# Patient Record
Sex: FEMALE | Race: WHITE | NOT HISPANIC OR LATINO | Employment: OTHER | URBAN - METROPOLITAN AREA
[De-identification: names, ages, dates, MRNs, and addresses within clinical notes are randomized per-mention and may not be internally consistent; named-entity substitution may affect disease eponyms.]

---

## 2017-04-03 ENCOUNTER — HOSPITAL ENCOUNTER (OUTPATIENT)
Dept: GENERAL RADIOLOGY | Facility: HOSPITAL | Age: 77
Discharge: HOME | End: 2017-04-03

## 2017-04-11 ENCOUNTER — HOSPITAL ENCOUNTER (OUTPATIENT)
Dept: FAMILY MEDICINE CLINIC | Facility: CLINIC | Age: 77
Setting detail: SPECIMEN
Discharge: HOME | End: 2017-04-11

## 2017-04-13 LAB
BACTERIA SPEC AEROBE CULT: NORMAL
Lab: NORMAL
MICRO REPORT STATUS: NORMAL
SPECIMEN SOURCE: NORMAL

## 2017-07-13 ENCOUNTER — HOSPITAL ENCOUNTER (OUTPATIENT)
Dept: FAMILY MEDICINE CLINIC | Facility: CLINIC | Age: 77
Setting detail: SPECIMEN
Discharge: HOME | End: 2017-07-13
Attending: FAMILY MEDICINE | Admitting: FAMILY MEDICINE

## 2017-07-13 LAB
ALBUMIN SERPL-MCNC: 4 G/DL (ref 3.5–4.8)
ALBUMIN/GLOB SERPL: 1.4 {RATIO} (ref 1–1.7)
ALP SERPL-CCNC: 85 IU/L (ref 32–91)
ALT SERPL-CCNC: 22 IU/L (ref 14–54)
ANION GAP SERPL CALC-SCNC: 14.2 MMOL/L (ref 10–20)
AST SERPL-CCNC: 24 IU/L (ref 15–41)
BILIRUB SERPL-MCNC: 0.7 MG/DL (ref 0.3–1.2)
BUN SERPL-MCNC: 19 MG/DL (ref 8–20)
BUN/CREAT SERPL: 27.1 (ref 5.4–26.2)
CALCIUM SERPL-MCNC: 9.3 MG/DL (ref 8.9–10.3)
CHLORIDE SERPL-SCNC: 107 MMOL/L (ref 101–111)
CONV CO2: 24 MMOL/L (ref 22–32)
CONV TOTAL PROTEIN: 6.8 G/DL (ref 6.1–7.9)
CREAT UR-MCNC: 0.7 MG/DL (ref 0.4–1)
GLOBULIN UR ELPH-MCNC: 2.8 G/DL (ref 2.5–3.8)
GLUCOSE SERPL-MCNC: 171 MG/DL (ref 65–99)
POTASSIUM SERPL-SCNC: 4.2 MMOL/L (ref 3.6–5.1)
SODIUM SERPL-SCNC: 141 MMOL/L (ref 136–144)

## 2017-11-16 ENCOUNTER — HOSPITAL ENCOUNTER (OUTPATIENT)
Dept: FAMILY MEDICINE CLINIC | Facility: CLINIC | Age: 77
Setting detail: SPECIMEN
Discharge: HOME | End: 2017-11-16
Attending: FAMILY MEDICINE | Admitting: FAMILY MEDICINE

## 2017-11-16 LAB
ANION GAP SERPL CALC-SCNC: 10.1 MMOL/L (ref 10–20)
BUN SERPL-MCNC: 14 MG/DL (ref 8–20)
BUN/CREAT SERPL: 17.5 (ref 5.4–26.2)
CALCIUM SERPL-MCNC: 9.5 MG/DL (ref 8.9–10.3)
CHLORIDE SERPL-SCNC: 105 MMOL/L (ref 101–111)
CONV CO2: 28 MMOL/L (ref 22–32)
CREAT UR-MCNC: 0.8 MG/DL (ref 0.4–1)
GLUCOSE SERPL-MCNC: 161 MG/DL (ref 65–99)
POTASSIUM SERPL-SCNC: 5.1 MMOL/L (ref 3.6–5.1)
SODIUM SERPL-SCNC: 138 MMOL/L (ref 136–144)
TSH SERPL-ACNC: 0.78 UIU/ML (ref 0.34–5.6)

## 2017-12-01 ENCOUNTER — HOSPITAL ENCOUNTER (OUTPATIENT)
Dept: MAMMOGRAPHY | Facility: HOSPITAL | Age: 77
Discharge: HOME | End: 2017-12-01
Attending: FAMILY MEDICINE | Admitting: FAMILY MEDICINE

## 2018-01-09 ENCOUNTER — HOSPITAL ENCOUNTER (OUTPATIENT)
Dept: GENERAL RADIOLOGY | Facility: HOSPITAL | Age: 78
Discharge: HOME | End: 2018-01-09
Attending: FAMILY MEDICINE | Admitting: FAMILY MEDICINE

## 2018-02-14 ENCOUNTER — HOSPITAL ENCOUNTER (OUTPATIENT)
Dept: FAMILY MEDICINE CLINIC | Facility: CLINIC | Age: 78
Setting detail: SPECIMEN
Discharge: HOME | End: 2018-02-14
Attending: FAMILY MEDICINE | Admitting: FAMILY MEDICINE

## 2018-02-15 LAB
BACTERIA SPEC AEROBE CULT: NORMAL
Lab: NORMAL
MICRO REPORT STATUS: NORMAL
SPECIMEN SOURCE: NORMAL

## 2018-05-15 ENCOUNTER — HOSPITAL ENCOUNTER (OUTPATIENT)
Dept: FAMILY MEDICINE CLINIC | Facility: CLINIC | Age: 78
Setting detail: SPECIMEN
Discharge: HOME | End: 2018-05-15
Attending: FAMILY MEDICINE | Admitting: FAMILY MEDICINE

## 2018-05-15 LAB
ALBUMIN SERPL-MCNC: 3.9 G/DL (ref 3.5–4.8)
ALBUMIN/GLOB SERPL: 1.2 {RATIO} (ref 1–1.7)
ALP SERPL-CCNC: 89 IU/L (ref 32–91)
ALT SERPL-CCNC: 24 IU/L (ref 14–54)
ANION GAP SERPL CALC-SCNC: 9.6 MMOL/L (ref 10–20)
AST SERPL-CCNC: 21 IU/L (ref 15–41)
BILIRUB SERPL-MCNC: 0.3 MG/DL (ref 0.3–1.2)
BUN SERPL-MCNC: 12 MG/DL (ref 8–20)
BUN/CREAT SERPL: 17.1 (ref 5.4–26.2)
CALCIUM SERPL-MCNC: 9.2 MG/DL (ref 8.9–10.3)
CHLORIDE SERPL-SCNC: 107 MMOL/L (ref 101–111)
CONV CO2: 26 MMOL/L (ref 22–32)
CONV TOTAL PROTEIN: 7.1 G/DL (ref 6.1–7.9)
CREAT UR-MCNC: 0.7 MG/DL (ref 0.4–1)
GLOBULIN UR ELPH-MCNC: 3.2 G/DL (ref 2.5–3.8)
GLUCOSE SERPL-MCNC: 123 MG/DL (ref 65–99)
POTASSIUM SERPL-SCNC: 4.6 MMOL/L (ref 3.6–5.1)
SODIUM SERPL-SCNC: 138 MMOL/L (ref 136–144)

## 2018-10-28 ENCOUNTER — HOSPITAL ENCOUNTER (OUTPATIENT)
Dept: URGENT CARE | Facility: CLINIC | Age: 78
Discharge: HOME | End: 2018-10-28
Attending: FAMILY MEDICINE | Admitting: FAMILY MEDICINE

## 2019-03-22 ENCOUNTER — HOSPITAL ENCOUNTER (OUTPATIENT)
Dept: FAMILY MEDICINE CLINIC | Facility: CLINIC | Age: 79
Setting detail: SPECIMEN
Discharge: HOME | End: 2019-03-22
Attending: FAMILY MEDICINE | Admitting: FAMILY MEDICINE

## 2019-03-22 LAB
ALBUMIN SERPL-MCNC: 4.3 G/DL (ref 3.5–4.8)
ALBUMIN/GLOB SERPL: 1.3 {RATIO} (ref 1–1.7)
ALP SERPL-CCNC: 100 IU/L (ref 32–91)
ALT SERPL-CCNC: 24 IU/L (ref 14–54)
ANION GAP SERPL CALC-SCNC: 15.5 MMOL/L (ref 10–20)
AST SERPL-CCNC: 21 IU/L (ref 15–41)
BILIRUB SERPL-MCNC: 0.5 MG/DL (ref 0.3–1.2)
BUN SERPL-MCNC: 13 MG/DL (ref 8–20)
BUN/CREAT SERPL: 16.3 (ref 5.4–26.2)
CALCIUM SERPL-MCNC: 9.3 MG/DL (ref 8.9–10.3)
CHLORIDE SERPL-SCNC: 107 MMOL/L (ref 101–111)
CONV CO2: 21 MMOL/L (ref 22–32)
CONV MICROALBUM.,U,RANDOM: 59 MG/L
CONV TOTAL PROTEIN: 7.6 G/DL (ref 6.1–7.9)
CREAT 24H UR-MCNC: 90.3 MG/DL
CREAT UR-MCNC: 0.8 MG/DL (ref 0.4–1)
GLOBULIN UR ELPH-MCNC: 3.3 G/DL (ref 2.5–3.8)
GLUCOSE SERPL-MCNC: 163 MG/DL (ref 65–99)
MICROALBUMIN/CREAT UR: 65.3 UG/MG
POTASSIUM SERPL-SCNC: 4.5 MMOL/L (ref 3.6–5.1)
SODIUM SERPL-SCNC: 139 MMOL/L (ref 136–144)

## 2019-04-15 ENCOUNTER — HOSPITAL ENCOUNTER (OUTPATIENT)
Dept: MAMMOGRAPHY | Facility: HOSPITAL | Age: 79
Discharge: HOME | End: 2019-04-15
Attending: FAMILY MEDICINE | Admitting: FAMILY MEDICINE

## 2019-06-11 ENCOUNTER — CONVERSION ENCOUNTER (OUTPATIENT)
Dept: FAMILY MEDICINE CLINIC | Facility: CLINIC | Age: 79
End: 2019-06-11

## 2019-06-12 VITALS
DIASTOLIC BLOOD PRESSURE: 81 MMHG | WEIGHT: 133 LBS | SYSTOLIC BLOOD PRESSURE: 149 MMHG | OXYGEN SATURATION: 98 % | BODY MASS INDEX: 25.11 KG/M2 | HEIGHT: 61 IN | HEART RATE: 86 BPM

## 2019-06-13 NOTE — PROGRESS NOTES
Visit Type:  Acute Visit  Referring Provider:  Shavonne Rush MD  Primary Provider:  Adri FLORIAN MD      History of Present Illness:  Pt noticed a bump on her head a couple months ago, started having headaches couple week ago     comes in with c/o a bump on her head for a few months  developed headaches 2 weeks ago  2 falls but both were triggered (tripped on a cord and caught foot on a loose tile)  saw eye doctor recently and was told all was good  daughter dx with breast cancer in March  she says it is a discomfort - pulsation/throb- the radiates vertically down the occiput on the left  brief and intermittent  lasts alfred a few seconds  usuallly only a few times per week  the headache is always on the left occiput as well       Past Medical History:     Reviewed history from 08/10/2017 and no changes required:        Arthritis        Diabetes        melanoma- yearly w/ derm    Family History Summary:      Reviewed history Last on 03/22/2019 and no changes required:06/11/2019  Daughter - Has Family History of Breast Cancer - Entered On: 3/22/2019      Social History:     Reviewed history from 03/22/2019 and no changes required:        Patient has never smoked.        Passive Smoke: N        Alcohol Use: N        Drug Use: N        HIV/High Risk: N        Regular Exercise: N                Alcohol Use: N    Social History Summary:  Patient has never smoked.  Passive Smoke: N  Alcohol Use: N  Drug Use: N  HIV/High Risk: N  Regular Exercise: N    Alcohol Use: N  Social History Reviewed: 06/11/2019        Active Medications (reviewed today):  AMLODIPINE BESYLATE TABS 5MG (AMLODIPINE BESYLATE) TAKE 1 TABLET DAILY FOR BLOOD PRESSURE  GLIMEPIRIDE TABS 4MG (GLIMEPIRIDE) TAKE 1 TABLET TWICE A DAY  ONETOUCH ULTRA BLUE IN VITRO STRIP (GLUCOSE BLOOD) test blood sugar daily    Current Allergies (reviewed today):  * CODEINE (Critical)  * LISINOPRIL (Critical)      Risk Factors:     Smoked Tobacco Use:   Never smoker  Drug use:  no  HIV high-risk behavior:  no  Alcohol use:  no  Exercise:  no      Review of Systems     General       Denies fever, chills, sweats, anorexia, fatigue, weakness, malaise, weight loss, weight gain and sleep disorder.    Resp       Denies cough, shortness of breath and chest discomfort.    GI       Denies nausea and vomiting.    MS       Complains of neck pain.    Derm       Denies rash.    Neuro       Complains of headaches.       Denies numbness, tingling, visual disturbances, weakness, sensation of room spinning, tremors and dizziness.    Heme       Denies enlarged lymph nodes, bleeding, skin discoloration, abnormal bruising, fevers and nose bleeds.      Vital Signs:    Patient Profile:    78 Years Old Female  Height:     61 inches  Weight:     133 pounds  BMI:        25.13     O2 Sat:     98 %  Temp:       97.9 degrees F oral  Pulse rate: 86 / minute  BP Sittin / 81  (left arm)    Cuff size:  regular      Problems: Active problems were reviewed with the patient during this visit.  Medications: Medications were reviewed with the patient during this visit.  Allergies: Allergies were reviewed with the patient during this visit.        Vitals Entered By: Yani Cervantes CMA (2019 1:38 PM)        Blood Pressure:  Today's BP: 149/81 mm Hg    Labwork:   Most Recent Lab Results:   HbA1c: : 7.0 % 2019        Impression & Recommendations:    Problem # 1:  HEADACHE (ICD-784.0) ([id number])  Assessment: New  feel like this is tension in origin  if symptoms change or increase in severity, will consider CT    Problem # 2:  MUSCLE SPASM, TRAPEZIUS (ICD-728.85) ([id number]-W71.830)  Assessment: New  hot showers and warm compresses to neck and shoulders   stress reduction      Patient Instructions:  1)  stress reduction  2)  hot showers and warm compresses to neck and shoulders  3)  if the discomfort gets worse, let me kinow                        Medication Administration    Orders  Added:  1)  [facility], Est Level III [06443]  ]      Electronically signed by hSavonne Rush MD on 06/11/2019 at 11:04 PM  ________________________________________________________________________       Disclaimer: Converted Note message may not contain all data elements that existed in the legacy source system. Please see [employer] System for the original note details.

## 2019-07-29 ENCOUNTER — TELEPHONE (OUTPATIENT)
Dept: FAMILY MEDICINE CLINIC | Facility: CLINIC | Age: 79
End: 2019-07-29

## 2019-08-28 ENCOUNTER — TELEPHONE (OUTPATIENT)
Dept: FAMILY MEDICINE CLINIC | Facility: CLINIC | Age: 79
End: 2019-08-28

## 2019-08-28 RX ORDER — DIPHENHYDRAMINE HCL 25 MG
1 TABLET ORAL DAILY
Qty: 1 KIT | Refills: 0 | Status: SHIPPED | OUTPATIENT
Start: 2019-08-28 | End: 2019-09-09 | Stop reason: SDUPTHER

## 2019-08-28 RX ORDER — GLUCOSAM/CHON-MSM1/C/MANG/BOSW 500-416.6
TABLET ORAL
Qty: 100 EACH | Refills: 1 | Status: SHIPPED | OUTPATIENT
Start: 2019-08-28 | End: 2019-09-09 | Stop reason: SDUPTHER

## 2019-08-28 RX ORDER — ISOPROPYL ALCOHOL 0.75 G/1
1 SWAB TOPICAL DAILY
Qty: 100 EACH | Refills: 1 | Status: SHIPPED | OUTPATIENT
Start: 2019-08-28

## 2019-08-28 RX ORDER — BLOOD-GLUCOSE CONTROL, LOW
1 EACH MISCELLANEOUS
Qty: 1 EACH | Refills: 1 | Status: SHIPPED | OUTPATIENT
Start: 2019-08-28

## 2019-09-09 RX ORDER — GLUCOSAM/CHON-MSM1/C/MANG/BOSW 500-416.6
TABLET ORAL
Qty: 100 EACH | Refills: 1 | Status: SHIPPED | OUTPATIENT
Start: 2019-09-09

## 2019-09-09 RX ORDER — DIPHENHYDRAMINE HCL 25 MG
1 TABLET ORAL DAILY
Qty: 1 KIT | Refills: 0 | Status: SHIPPED | OUTPATIENT
Start: 2019-09-09

## 2019-09-17 RX ORDER — GLIMEPIRIDE 4 MG/1
TABLET ORAL
Qty: 180 TABLET | Refills: 2 | Status: SHIPPED | OUTPATIENT
Start: 2019-09-17 | End: 2020-07-01 | Stop reason: SDUPTHER

## 2019-09-23 ENCOUNTER — OFFICE VISIT (OUTPATIENT)
Dept: FAMILY MEDICINE CLINIC | Facility: CLINIC | Age: 79
End: 2019-09-23

## 2019-09-23 ENCOUNTER — LAB (OUTPATIENT)
Dept: FAMILY MEDICINE CLINIC | Facility: CLINIC | Age: 79
End: 2019-09-23

## 2019-09-23 VITALS
SYSTOLIC BLOOD PRESSURE: 142 MMHG | HEIGHT: 62 IN | DIASTOLIC BLOOD PRESSURE: 80 MMHG | WEIGHT: 130 LBS | BODY MASS INDEX: 23.92 KG/M2 | HEART RATE: 82 BPM | TEMPERATURE: 98.4 F | OXYGEN SATURATION: 97 %

## 2019-09-23 DIAGNOSIS — R80.9 MICROALBUMINURIA: ICD-10-CM

## 2019-09-23 DIAGNOSIS — E11.65 TYPE 2 DIABETES MELLITUS WITH HYPERGLYCEMIA, WITHOUT LONG-TERM CURRENT USE OF INSULIN (HCC): ICD-10-CM

## 2019-09-23 DIAGNOSIS — I10 ESSENTIAL HYPERTENSION: Primary | ICD-10-CM

## 2019-09-23 DIAGNOSIS — Z23 NEED FOR VACCINATION: ICD-10-CM

## 2019-09-23 PROBLEM — R30.0 DIFFICULT OR PAINFUL URINATION: Status: ACTIVE | Noted: 2017-04-11

## 2019-09-23 PROBLEM — Z85.820 HISTORY OF MALIGNANT MELANOMA OF SKIN: Status: ACTIVE | Noted: 2017-05-10

## 2019-09-23 PROBLEM — M19.90 OSTEOARTHRITIS: Status: ACTIVE | Noted: 2018-02-20

## 2019-09-23 PROBLEM — B35.4 TINEA CORPORIS: Status: ACTIVE | Noted: 2018-05-15

## 2019-09-23 PROBLEM — M79.601 ARM PAIN, RIGHT: Status: ACTIVE | Noted: 2018-11-05

## 2019-09-23 PROBLEM — R03.0 ELEVATED BLOOD PRESSURE READING WITHOUT DIAGNOSIS OF HYPERTENSION: Status: ACTIVE | Noted: 2018-10-28

## 2019-09-23 PROBLEM — Z12.31 OTHER SCREENING MAMMOGRAM: Status: ACTIVE | Noted: 2017-11-16

## 2019-09-23 PROBLEM — N81.10 FEMALE BLADDER PROLAPSE: Status: ACTIVE | Noted: 2017-05-10

## 2019-09-23 PROBLEM — M19.029 ARTHRITIS OF ELBOW: Status: ACTIVE | Noted: 2018-10-28

## 2019-09-23 PROBLEM — E11.8 DISORDER ASSOCIATED WITH TYPE 2 DIABETES MELLITUS (HCC): Status: ACTIVE | Noted: 2017-05-10

## 2019-09-23 LAB
ALBUMIN SERPL-MCNC: 4.3 G/DL (ref 3.5–4.8)
ALBUMIN UR-MCNC: 12 MG/L
ALBUMIN/GLOB SERPL: 1.3 G/DL (ref 1–1.7)
ALP SERPL-CCNC: 93 U/L (ref 32–91)
ALT SERPL W P-5'-P-CCNC: 28 U/L (ref 14–54)
ANION GAP SERPL CALCULATED.3IONS-SCNC: 11.8 MMOL/L (ref 5–15)
AST SERPL-CCNC: 21 U/L (ref 15–41)
BILIRUB SERPL-MCNC: 0.5 MG/DL (ref 0.3–1.2)
BUN BLD-MCNC: 18 MG/DL (ref 8–20)
BUN/CREAT SERPL: 20 (ref 5.4–26.2)
CALCIUM SPEC-SCNC: 9.2 MG/DL (ref 8.9–10.3)
CHLORIDE SERPL-SCNC: 106 MMOL/L (ref 101–111)
CO2 SERPL-SCNC: 25 MMOL/L (ref 22–32)
CREAT BLD-MCNC: 0.9 MG/DL (ref 0.4–1)
GFR SERPL CREATININE-BSD FRML MDRD: 61 ML/MIN/1.73
GLOBULIN UR ELPH-MCNC: 3.3 GM/DL (ref 2.5–3.8)
GLUCOSE BLD-MCNC: 176 MG/DL (ref 65–99)
HBA1C MFR BLD: 7.3 % (ref 3.5–5.6)
POTASSIUM BLD-SCNC: 4.8 MMOL/L (ref 3.6–5.1)
PROT SERPL-MCNC: 7.6 G/DL (ref 6.1–7.9)
SODIUM BLD-SCNC: 138 MMOL/L (ref 136–144)

## 2019-09-23 PROCEDURE — 80053 COMPREHEN METABOLIC PANEL: CPT | Performed by: FAMILY MEDICINE

## 2019-09-23 PROCEDURE — G0008 ADMIN INFLUENZA VIRUS VAC: HCPCS | Performed by: FAMILY MEDICINE

## 2019-09-23 PROCEDURE — 99213 OFFICE O/P EST LOW 20 MIN: CPT | Performed by: FAMILY MEDICINE

## 2019-09-23 PROCEDURE — 82043 UR ALBUMIN QUANTITATIVE: CPT | Performed by: FAMILY MEDICINE

## 2019-09-23 PROCEDURE — 90674 CCIIV4 VAC NO PRSV 0.5 ML IM: CPT | Performed by: FAMILY MEDICINE

## 2019-09-23 PROCEDURE — 83036 HEMOGLOBIN GLYCOSYLATED A1C: CPT | Performed by: FAMILY MEDICINE

## 2019-09-23 PROCEDURE — 36415 COLL VENOUS BLD VENIPUNCTURE: CPT | Performed by: FAMILY MEDICINE

## 2019-09-23 RX ORDER — GLIMEPIRIDE 4 MG/1
TABLET ORAL EVERY 12 HOURS
COMMUNITY
Start: 2018-08-03 | End: 2019-11-06

## 2019-09-23 RX ORDER — AMLODIPINE BESYLATE 5 MG/1
TABLET ORAL
COMMUNITY
Start: 2019-02-07 | End: 2020-03-23

## 2019-09-23 NOTE — PROGRESS NOTES
Subjective   Raul Howell is a 78 y.o. female.     Here for follow-up on blood pressure and diabetes  BS running 100-150  Last eye exam was a few mo ago  [facility] [place]           The following portions of the patient's history were reviewed and updated as appropriate: allergies, current medications, past family history, past medical history, past social history, past surgical history and problem list.  Past Medical History:   Diagnosis Date   • Arthritis    • Diabetes (CMS/HCC)    • Melanoma (CMS/HCC)      Past Surgical History:   Procedure Laterality Date   • BLADDER SURGERY      2x   • KNEE SURGERY      right total knee   • WISDOM TOOTH EXTRACTION       Family History   Problem Relation Age of Onset   • Breast cancer Daughter      Social History     Socioeconomic History   • Marital status:      Spouse name: Not on file   • Number of children: Not on file   • Years of education: Not on file   • Highest education level: Not on file   Tobacco Use   • Smoking status: Never Smoker   Substance and Sexual Activity   • Alcohol use: No     Frequency: Never   • Drug use: No         Current Outpatient Medications:   •  amLODIPine (NORVASC) 5 MG tablet, AMLODIPINE BESYLATE 5 MG TABS, Disp: , Rfl:   •  glimepiride (AMARYL) 4 MG tablet, Every 12 (Twelve) Hours., Disp: , Rfl:   •  glucose blood (ONE TOUCH ULTRA TEST) test strip, ONETOUCH ULTRA BLUE STRP, Disp: , Rfl:   •  Alcohol Swabs (B-D SINGLE USE SWABS REGULAR) pads, 1 swab Daily., Disp: 100 each, Rfl: 1  •  Blood Glucose Calibration (TRUE METRIX LEVEL 2) Normal solution, 1 dose by In Vitro route Every 30 (Thirty) Days., Disp: 1 each, Rfl: 1  •  Blood Glucose Monitoring Suppl (TRUE METRIX AIR GLUCOSE METER) w/Device kit, 1 strip Daily., Disp: 1 kit, Rfl: 0  •  glimepiride (AMARYL) 4 MG tablet, TAKE 1 TABLET TWICE A DAY, Disp: 180 tablet, Rfl: 2  •  glucose blood (TRUE METRIX BLOOD GLUCOSE TEST) test strip, Test bs once a day (true metrix), Disp: 100  "each, Rfl: 1  •  TRUEPLUS LANCETS 30G misc, Test BS once a day, Disp: 100 each, Rfl: 1  No current facility-administered medications for this visit.     Review of Systems   Constitutional: Negative for diaphoresis, fatigue, fever, unexpected weight gain and unexpected weight loss.   Respiratory: Negative for cough, chest tightness and shortness of breath.    Cardiovascular: Negative for chest pain, palpitations and leg swelling.   Neurological: Negative for dizziness, syncope, numbness and headache.     /80   Pulse 82   Temp 98.4 °F (36.9 °C) (Oral)   Ht 157.5 cm (62\")   Wt 59 kg (130 lb)   SpO2 97%   BMI 23.78 kg/m²       Objective   Physical Exam   Constitutional: She appears well-developed and well-nourished. No distress.   HENT:   Head: Normocephalic and atraumatic.   Neck: Neck supple. No JVD present. No thyromegaly present.   Cardiovascular: Normal rate, regular rhythm, normal heart sounds and intact distal pulses. Exam reveals no gallop and no friction rub.   No murmur heard.  Pulmonary/Chest: Effort normal and breath sounds normal. No respiratory distress. She has no wheezes. She has no rales.   Musculoskeletal: She exhibits no edema.    Raul had a diabetic foot exam performed today.   During the foot exam she had a monofilament test not performed.  Vascular Status -  Her right foot exhibits normal foot vasculature  and no edema. Her left foot exhibits normal foot vasculature  and no edema.  Skin Integrity  -  Her right foot skin is intact. She has right foot onychomycosis and callous right foot.  She has no right foot ulcer, no ingrown toenail on right foot, right heel is not dry and cracked, no right foot warmth, no right foot blister and no right foot gangrenous changes.Her left foot skin is intact.She has left foot onychomycosis and callous left foot. She has no left foot ulcer, no left ingrown toenail, no left heel dry and cracked, no left foot warmth, no left foot blister and no left foot " gangrenous changes..   Foot Structure and Biomechanics -  Her right foot exhibits hallux valgus. Her right foot has no pes cavus, no claw toes and no hammer toes present.  Her left foot exhibits hallux valgus. Her left foot has no pes cavus, no claw toes and no hammer toes.  Lymphadenopathy:     She has no cervical adenopathy.   Neurological: She is alert.   Skin: Skin is warm and dry.   Psychiatric: She has a normal mood and affect.   Nursing note and vitals reviewed.        Assessment/Plan   Problems Addressed this Visit        Cardiovascular and Mediastinum    Hypertension - Primary    Relevant Medications    amLODIPine (NORVASC) 5 MG tablet    Other Relevant Orders    Comprehensive Metabolic Panel       Genitourinary    Microalbuminuria    Relevant Orders    MicroAlbumin, Urine, Random - Urine, Clean Catch      Other Visit Diagnoses     Type 2 diabetes mellitus with hyperglycemia, without long-term current use of insulin (CMS/[facility])        Relevant Medications    glimepiride (AMARYL) 4 MG tablet    Other Relevant Orders    Comprehensive Metabolic Panel    Hemoglobin A1c    MicroAlbumin, Urine, Random - Urine, Clean Catch    Need for vaccination        Relevant Medications    Influenza Vac Subunit Quad (FLUCELVAX) injection 0.5 mL (Completed) (Start on 9/23/2019 11:38 AM)

## 2019-10-16 ENCOUNTER — TELEPHONE (OUTPATIENT)
Dept: FAMILY MEDICINE CLINIC | Facility: CLINIC | Age: 79
End: 2019-10-16

## 2019-11-06 ENCOUNTER — OFFICE VISIT (OUTPATIENT)
Dept: FAMILY MEDICINE CLINIC | Facility: CLINIC | Age: 79
End: 2019-11-06

## 2019-11-06 VITALS
DIASTOLIC BLOOD PRESSURE: 83 MMHG | HEIGHT: 62 IN | SYSTOLIC BLOOD PRESSURE: 151 MMHG | OXYGEN SATURATION: 100 % | WEIGHT: 130 LBS | HEART RATE: 76 BPM | BODY MASS INDEX: 23.92 KG/M2

## 2019-11-06 DIAGNOSIS — R05.9 COUGH: Primary | ICD-10-CM

## 2019-11-06 DIAGNOSIS — M25.562 ACUTE PAIN OF LEFT KNEE: ICD-10-CM

## 2019-11-06 DIAGNOSIS — M62.838 TRAPEZIUS MUSCLE SPASM: ICD-10-CM

## 2019-11-06 PROCEDURE — 99213 OFFICE O/P EST LOW 20 MIN: CPT | Performed by: FAMILY MEDICINE

## 2019-11-06 NOTE — PATIENT INSTRUCTIONS
Be careful on wet surfaces  Be aware of surroundings  Hot showers and warm compresses to neck, shoulder and knees  Extra rest and fluids

## 2019-11-06 NOTE — PROGRESS NOTES
Subjective   Raul Howell is a 79 y.o. female.     She comes in today with several complaints  one is pain in the posterior aspect of her neck and head  Recurrent prob  Most recently noticed it while away on a trip  She was washing her car and either slipped on the surface or tripped on the hose  She fell forward and hit her face on the closed garage door and her knees on the driveway  No LOC  She was seen at the [facility]  Small laceration on the left side of her nose was repaired and sutures removed this past Monday  Still has some left knee pain and swelling   X-ray showed OA and small joint effusion  Has been doing ice  Has also had a cough for a week  Not productive  Feels fine           The following portions of the patient's history were reviewed and updated as appropriate: allergies, current medications, past family history, past medical history, past social history, past surgical history and problem list.  Past Medical History:   Diagnosis Date   • Arthritis    • Diabetes (CMS/HCC)    • Melanoma (CMS/HCC)      Past Surgical History:   Procedure Laterality Date   • BLADDER SURGERY      2x   • KNEE SURGERY      right total knee   • WISDOM TOOTH EXTRACTION       Family History   Problem Relation Age of Onset   • Breast cancer Daughter      Social History     Socioeconomic History   • Marital status:      Spouse name: Not on file   • Number of children: Not on file   • Years of education: Not on file   • Highest education level: Not on file   Tobacco Use   • Smoking status: Never Smoker   Substance and Sexual Activity   • Alcohol use: No     Frequency: Never   • Drug use: No         Current Outpatient Medications:   •  Alcohol Swabs (B-D SINGLE USE SWABS REGULAR) pads, 1 swab Daily., Disp: 100 each, Rfl: 1  •  amLODIPine (NORVASC) 5 MG tablet, AMLODIPINE BESYLATE 5 MG TABS, Disp: , Rfl:   •  benzonatate (TESSALON) 200 MG capsule, 1 capsule p.o. every 8 hours as needed cough, Disp: 21 capsule, Rfl: 0  •  Blood  "Glucose Calibration (TRUE METRIX LEVEL 2) Normal solution, 1 dose by In Vitro route Every 30 (Thirty) Days., Disp: 1 each, Rfl: 1  •  Blood Glucose Monitoring Suppl (TRUE METRIX AIR GLUCOSE METER) w/Device kit, 1 strip Daily., Disp: 1 kit, Rfl: 0  •  glimepiride (AMARYL) 4 MG tablet, TAKE 1 TABLET TWICE A DAY, Disp: 180 tablet, Rfl: 2  •  glucose blood (TRUE METRIX BLOOD GLUCOSE TEST) test strip, Test bs once a day (true metrix), Disp: 100 each, Rfl: 1  •  TRUEPLUS LANCETS 30G misc, Test BS once a day, Disp: 100 each, Rfl: 1    Review of Systems   Constitutional: Negative for diaphoresis, fatigue, fever, unexpected weight gain and unexpected weight loss.   HENT: Negative for ear pain, facial swelling, hearing loss, sinus pressure, sore throat and tinnitus.    Eyes: Negative for blurred vision and visual disturbance.   Respiratory: Positive for cough (nonproductive). Negative for chest tightness and shortness of breath.    Cardiovascular: Negative for chest pain, palpitations and leg swelling.   Gastrointestinal: Negative for nausea and vomiting.   Musculoskeletal: Positive for arthralgias, joint swelling, neck pain and neck stiffness.   Neurological: Positive for headache. Negative for dizziness, syncope, light-headedness and numbness.   Hematological: Negative.      /83 (BP Location: Left arm, Patient Position: Sitting, Cuff Size: Adult)   Pulse 76   Ht 157.5 cm (62\")   Wt 59 kg (130 lb)   SpO2 100%   BMI 23.78 kg/m²       Objective   Physical Exam   Constitutional: She is oriented to person, place, and time. She appears well-developed and well-nourished. No distress.   HENT:   Head: Normocephalic and atraumatic.   Right Ear: Tympanic membrane normal.   Left Ear: Tympanic membrane normal.   Nose:       Mouth/Throat: Uvula is midline, oropharynx is clear and moist and mucous membranes are normal.   Neck: Neck supple. No JVD present. Muscular tenderness present. No spinous process tenderness present. No " neck rigidity. No thyromegaly present.   Trapezius spasm bilaterally   Cardiovascular: Normal rate, regular rhythm, normal heart sounds, intact distal pulses and normal pulses. Exam reveals no gallop and no friction rub.   No murmur heard.  Pulmonary/Chest: Effort normal and breath sounds normal. No respiratory distress. She has no wheezes. She has no rales.   Musculoskeletal: She exhibits no edema.        Left knee: She exhibits swelling and ecchymosis. She exhibits normal range of motion, no erythema, no LCL laxity and no MCL laxity. Tenderness found.   Lymphadenopathy:     She has no cervical adenopathy.   Neurological: She is alert and oriented to person, place, and time. Coordination and gait normal.   Skin: Skin is warm and dry.   Psychiatric: She has a normal mood and affect.   Nursing note and vitals reviewed.        Assessment/Plan   Problems Addressed this Visit     None      Visit Diagnoses     Cough    -  Primary    Acute pain of left knee        Trapezius muscle spasm              I reassured her about the cough; she has some benzonate  Left knee pain and swelling-she will start using warm compresses; recommended she see Ortho if the pain persists  Trapezius spasm-recommended hot showers and warm compresses as well as stress reduction; offered physical therapy but she deferred at this point

## 2020-02-24 ENCOUNTER — OFFICE VISIT (OUTPATIENT)
Dept: FAMILY MEDICINE CLINIC | Facility: CLINIC | Age: 80
End: 2020-02-24

## 2020-02-24 VITALS
SYSTOLIC BLOOD PRESSURE: 145 MMHG | BODY MASS INDEX: 23.45 KG/M2 | DIASTOLIC BLOOD PRESSURE: 82 MMHG | TEMPERATURE: 98.1 F | WEIGHT: 127.4 LBS | HEART RATE: 96 BPM | RESPIRATION RATE: 16 BRPM | HEIGHT: 62 IN | OXYGEN SATURATION: 96 %

## 2020-02-24 DIAGNOSIS — J06.9 UPPER RESPIRATORY TRACT INFECTION, UNSPECIFIED TYPE: Primary | ICD-10-CM

## 2020-02-24 PROCEDURE — 99213 OFFICE O/P EST LOW 20 MIN: CPT | Performed by: NURSE PRACTITIONER

## 2020-02-24 NOTE — PATIENT INSTRUCTIONS
Likely a viral code  stahist as needed- watch blood pressure closely  Push water intake  Take tylenol or ibuprofen for fever or discomfort  Get plenty of rest  Call if no improvement or worsening symptoms

## 2020-02-24 NOTE — PROGRESS NOTES
"Subjective   Raul Howell is a 79 y.o. female.     Patient is here today with complaints of hoarseness, cough, sore throat, and earache.  Symptoms started on Friday with body aches.  On Saturday she started sneezing and had a sore throat.  Her ears started hurting yesterday, left ear being the worse.  Cough is occasionally productive.  She does not smoke.  Reports some congestion and drainage.  Denies a fever.  She has been around her grand daughter who has had cold like symptoms,  She has tried taking an OTC cold and flu medication  And some tylenol.           The following portions of the patient's history were reviewed and updated as appropriate: allergies, current medications, past family history, past medical history, past social history, past surgical history and problem list.    Review of Systems   Constitutional: Positive for fatigue. Negative for chills and fever.   HENT: Positive for congestion, ear pain, postnasal drip, rhinorrhea, sinus pressure, sore throat and voice change.    Eyes: Negative for blurred vision and double vision.   Respiratory: Positive for cough. Negative for chest tightness, shortness of breath and wheezing.    Cardiovascular: Negative for chest pain and palpitations.   Gastrointestinal: Negative for abdominal pain, constipation, diarrhea, nausea and vomiting.   Musculoskeletal: Positive for myalgias.   Neurological: Positive for headache (mild). Negative for dizziness.       Objective   /82 (BP Location: Left arm, Patient Position: Sitting, Cuff Size: Adult)   Pulse 96   Temp 98.1 °F (36.7 °C) (Oral)   Resp 16   Ht 157.5 cm (62\")   Wt 57.8 kg (127 lb 6.4 oz)   SpO2 96%   Breastfeeding No   BMI 23.30 kg/m²   Physical Exam   Constitutional: She is oriented to person, place, and time. She appears well-developed and well-nourished. No distress.   HENT:   Head: Normocephalic and atraumatic.   Right Ear: Hearing, tympanic membrane and external ear normal.   Left Ear: " Hearing, tympanic membrane and external ear normal.   Mouth/Throat: Posterior oropharyngeal erythema present.   Eyes: Pupils are equal, round, and reactive to light. EOM are normal.   Cardiovascular: Normal rate, regular rhythm and normal heart sounds.   Pulmonary/Chest: Effort normal and breath sounds normal. No respiratory distress. She has no wheezes.   Abdominal: Soft. Bowel sounds are normal.   Neurological: She is alert and oriented to person, place, and time.   Skin: Skin is warm and dry.   Psychiatric: She has a normal mood and affect. Her behavior is normal. Judgment and thought content normal.         Assessment/Plan     Diagnoses and all orders for this visit:    1. Upper respiratory tract infection, unspecified type (Primary)  Comments:  likely viral  symptomatic treatment  stahist as needed- watch BP closely  push water  call for worsening symptoms  Orders:  -     Chlorcyclizine-Pseudoephed (STAHIST AD) 25-60 MG tablet; Take 1 tablet by mouth 2 (Two) Times a Day.  Dispense: 42 tablet; Refill: 0

## 2020-03-09 ENCOUNTER — TELEPHONE (OUTPATIENT)
Dept: FAMILY MEDICINE CLINIC | Facility: CLINIC | Age: 80
End: 2020-03-09

## 2020-03-09 RX ORDER — AMOXICILLIN 875 MG/1
875 TABLET, COATED ORAL 2 TIMES DAILY
Qty: 20 TABLET | Refills: 0 | Status: SHIPPED | OUTPATIENT
Start: 2020-03-09 | End: 2020-03-13

## 2020-03-09 NOTE — TELEPHONE ENCOUNTER
I will send in amoxicillin for 10 days.  If it does not improve she needs to come in to be reevaluated.

## 2020-03-09 NOTE — TELEPHONE ENCOUNTER
Pt saw Darlene 2/24/20, still has cough.  She is wondering if she could get something called in (antibiotic?)  Pharmacy is [facility] on [address]  Please call pt at [phone].

## 2020-03-13 ENCOUNTER — OFFICE VISIT (OUTPATIENT)
Dept: FAMILY MEDICINE CLINIC | Facility: CLINIC | Age: 80
End: 2020-03-13

## 2020-03-13 VITALS
HEIGHT: 62 IN | DIASTOLIC BLOOD PRESSURE: 82 MMHG | OXYGEN SATURATION: 95 % | HEART RATE: 88 BPM | TEMPERATURE: 98.2 F | WEIGHT: 131 LBS | SYSTOLIC BLOOD PRESSURE: 149 MMHG | BODY MASS INDEX: 24.11 KG/M2

## 2020-03-13 DIAGNOSIS — J06.9 ACUTE URI: Primary | ICD-10-CM

## 2020-03-13 PROCEDURE — 99213 OFFICE O/P EST LOW 20 MIN: CPT | Performed by: NURSE PRACTITIONER

## 2020-03-13 RX ORDER — CEFDINIR 300 MG/1
300 CAPSULE ORAL 2 TIMES DAILY
Qty: 20 CAPSULE | Refills: 0 | Status: SHIPPED | OUTPATIENT
Start: 2020-03-13 | End: 2020-03-23

## 2020-03-13 RX ORDER — BENZONATATE 100 MG/1
100 CAPSULE ORAL 3 TIMES DAILY PRN
Qty: 30 CAPSULE | Refills: 0 | Status: SHIPPED | OUTPATIENT
Start: 2020-03-13 | End: 2020-07-01

## 2020-03-13 NOTE — PROGRESS NOTES
Subjective   Raul Howell is a 79 y.o. female.       HPI   Pt. is here today with c/o cough and ear pain.  Symptoms started several weeks ago.  She was initially seen on 2/24 and diagnosed with a viral infection; given Stahist.  She has not improved and contacted the office on 3/9; she was sent in Amoxicillin.  She has been taking this and continues to feel ear pain, cough continues and now sore throat.  No fevers or body aches.  No wheezes.  Also continues Stahist.      The following portions of the patient's history were reviewed and updated as appropriate: allergies, current medications, past family history, past medical history, past social history, past surgical history and problem list.    Review of Systems   Constitutional: Negative for activity change, appetite change, chills, diaphoresis, fatigue, fever, unexpected weight gain and unexpected weight loss.   HENT: Positive for congestion, ear pain, postnasal drip and sore throat. Negative for ear discharge, rhinorrhea, sinus pressure, swollen glands and trouble swallowing.    Eyes: Negative for pain, discharge, redness and itching.   Respiratory: Positive for cough. Negative for chest tightness, shortness of breath and wheezing.    Cardiovascular: Negative for chest pain, palpitations and leg swelling.   Gastrointestinal: Negative for abdominal pain, diarrhea, nausea and vomiting.   Musculoskeletal: Negative for arthralgias and myalgias.   Skin: Negative for rash and skin lesions.   Neurological: Negative for dizziness, weakness, light-headedness, headache and confusion.   Psychiatric/Behavioral: Negative for depressed mood. The patient is not nervous/anxious.        Objective   Physical Exam   Constitutional: She is oriented to person, place, and time. She appears well-developed and well-nourished. No distress.   HENT:   Head: Normocephalic and atraumatic.   Right Ear: Hearing, external ear and ear canal normal. Tympanic membrane is erythematous.   Left  Ear: Hearing, external ear and ear canal normal. Tympanic membrane is erythematous.   Nose: Rhinorrhea present. Right sinus exhibits no maxillary sinus tenderness and no frontal sinus tenderness. Left sinus exhibits no maxillary sinus tenderness and no frontal sinus tenderness.   Mouth/Throat: Uvula is midline and mucous membranes are normal. Posterior oropharyngeal erythema present. No oropharyngeal exudate, posterior oropharyngeal edema or tonsillar abscesses.   Eyes: Pupils are equal, round, and reactive to light. Conjunctivae and EOM are normal. Right eye exhibits no discharge. Left eye exhibits no discharge.   Neck: Normal range of motion. Neck supple. No thyromegaly present.   Cardiovascular: Normal rate, regular rhythm, normal heart sounds and intact distal pulses.   No murmur heard.  Pulmonary/Chest: Effort normal and breath sounds normal. No respiratory distress. She has no wheezes. She exhibits no tenderness.   Abdominal: Soft. Bowel sounds are normal. She exhibits no distension. There is no tenderness.   Musculoskeletal: She exhibits no edema.   Lymphadenopathy:     She has no cervical adenopathy.   Neurological: She is alert and oriented to person, place, and time.   Skin: Skin is warm and dry. Capillary refill takes less than 2 seconds. No rash noted. No erythema.   Psychiatric: She has a normal mood and affect.   Vitals reviewed.        Assessment/Plan   Raul was seen today for cough and earache.    Diagnoses and all orders for this visit:    Acute URI  Comments:  Stop Amoxicllin  Given doxycycline  Tessalon PRN cough  Increase fluids and rest  Call for worsening  Orders:  -     cefdinir (OMNICEF) 300 MG capsule; Take 1 capsule by mouth 2 (Two) Times a Day for 10 days.  -     benzonatate (TESSALON PERLES) 100 MG capsule; Take 1 capsule by mouth 3 (Three) Times a Day As Needed for Cough.

## 2020-03-23 RX ORDER — AMLODIPINE BESYLATE 5 MG/1
TABLET ORAL
Qty: 90 TABLET | Refills: 3 | Status: SHIPPED | OUTPATIENT
Start: 2020-03-23 | End: 2021-05-27

## 2020-05-15 ENCOUNTER — TELEPHONE (OUTPATIENT)
Dept: FAMILY MEDICINE CLINIC | Facility: CLINIC | Age: 80
End: 2020-05-15

## 2020-05-15 DIAGNOSIS — R30.0 DYSURIA: Primary | ICD-10-CM

## 2020-05-15 NOTE — TELEPHONE ENCOUNTER
Pt states she has a UTI,  She is uable to come in because of her legs.  She is wondering if it would be possible to  a urine cup to leave a specimen to have tested? Her  could pick it up and bring it back.  Please call pt at [phone].

## 2020-05-15 NOTE — TELEPHONE ENCOUNTER
Please call patient and let her know that I have ordered a urinalysis.  She can  a sample cup at her convenience.    Please give patient instructions on how to store urine sample until she can return it to the office for testing.

## 2020-05-19 ENCOUNTER — CLINICAL SUPPORT (OUTPATIENT)
Dept: FAMILY MEDICINE CLINIC | Facility: CLINIC | Age: 80
End: 2020-05-19

## 2020-05-19 DIAGNOSIS — R30.0 DYSURIA: Primary | ICD-10-CM

## 2020-05-19 LAB
BILIRUB BLD-MCNC: NEGATIVE MG/DL
CLARITY, POC: CLEAR
COLOR UR: YELLOW
GLUCOSE UR STRIP-MCNC: NEGATIVE MG/DL
KETONES UR QL: NEGATIVE
LEUKOCYTE EST, POC: ABNORMAL
NITRITE UR-MCNC: NEGATIVE MG/ML
PH UR: 5 [PH] (ref 5–8)
PROT UR STRIP-MCNC: NEGATIVE MG/DL
RBC # UR STRIP: NEGATIVE /UL
SP GR UR: 1 (ref 1–1.03)
UROBILINOGEN UR QL: NORMAL

## 2020-05-19 PROCEDURE — 87147 CULTURE TYPE IMMUNOLOGIC: CPT | Performed by: NURSE PRACTITIONER

## 2020-05-19 PROCEDURE — 81003 URINALYSIS AUTO W/O SCOPE: CPT | Performed by: NURSE PRACTITIONER

## 2020-05-19 PROCEDURE — 87086 URINE CULTURE/COLONY COUNT: CPT | Performed by: NURSE PRACTITIONER

## 2020-05-21 LAB — BACTERIA SPEC AEROBE CULT: ABNORMAL

## 2020-05-22 DIAGNOSIS — R30.0 DYSURIA: Primary | ICD-10-CM

## 2020-05-22 RX ORDER — CEPHALEXIN 250 MG/1
250 CAPSULE ORAL 2 TIMES DAILY
Qty: 6 CAPSULE | Refills: 0 | Status: SHIPPED | OUTPATIENT
Start: 2020-05-22 | End: 2020-05-25

## 2020-07-01 ENCOUNTER — OFFICE VISIT (OUTPATIENT)
Dept: FAMILY MEDICINE CLINIC | Facility: CLINIC | Age: 80
End: 2020-07-01

## 2020-07-01 ENCOUNTER — LAB (OUTPATIENT)
Dept: FAMILY MEDICINE CLINIC | Facility: CLINIC | Age: 80
End: 2020-07-01

## 2020-07-01 VITALS
HEART RATE: 82 BPM | SYSTOLIC BLOOD PRESSURE: 154 MMHG | BODY MASS INDEX: 23.55 KG/M2 | DIASTOLIC BLOOD PRESSURE: 77 MMHG | WEIGHT: 128 LBS | TEMPERATURE: 97.3 F | HEIGHT: 62 IN | OXYGEN SATURATION: 99 %

## 2020-07-01 DIAGNOSIS — Z00.00 PREVENTATIVE HEALTH CARE: Primary | ICD-10-CM

## 2020-07-01 DIAGNOSIS — I10 ESSENTIAL HYPERTENSION: ICD-10-CM

## 2020-07-01 DIAGNOSIS — R80.9 MICROALBUMINURIA: ICD-10-CM

## 2020-07-01 DIAGNOSIS — E11.9 TYPE 2 DIABETES MELLITUS WITHOUT COMPLICATION, WITHOUT LONG-TERM CURRENT USE OF INSULIN (HCC): ICD-10-CM

## 2020-07-01 DIAGNOSIS — Z11.59 NEED FOR HEPATITIS C SCREENING TEST: ICD-10-CM

## 2020-07-01 PROCEDURE — 96160 PT-FOCUSED HLTH RISK ASSMT: CPT | Performed by: FAMILY MEDICINE

## 2020-07-01 PROCEDURE — G0439 PPPS, SUBSEQ VISIT: HCPCS | Performed by: FAMILY MEDICINE

## 2020-07-01 RX ORDER — GLIMEPIRIDE 4 MG/1
4 TABLET ORAL 2 TIMES DAILY
Qty: 180 TABLET | Refills: 3 | Status: SHIPPED | OUTPATIENT
Start: 2020-07-01 | End: 2021-06-08

## 2020-07-01 NOTE — PROGRESS NOTES
The ABCs of the Annual Wellness Visit  Subsequent Medicare Wellness Visit    Chief Complaint   Patient presents with   • Medicare Wellness-subsequent       Subjective   History of Present Illness:  Raul Howell is a 79 y.o. female who presents for a Subsequent Medicare Wellness Visit.  Also needs follow up on bp and dm  BS running high in am and 120 in afternoon  Stress sec to current pandemic  She was having knee pain and saw Dr. Goss  Feels better now after an injection  Saw  a month ago for her eyes          HEALTH RISK ASSESSMENT    Recent Hospitalizations:  No hospitalization(s) within the last year.    Current Medical Providers:  Patient Care Team:  Shavonne Rush MD as PCP - General  Shavonne Rush MD as PCP - Family Medicine    Smoking Status:  Social History     Tobacco Use   Smoking Status Never Smoker   Smokeless Tobacco Never Used       Alcohol Consumption:  Social History     Substance and Sexual Activity   Alcohol Use No   • Frequency: Never       Depression Screen:   PHQ-2/PHQ-9 Depression Screening 7/1/2020   Little interest or pleasure in doing things 0   Feeling down, depressed, or hopeless 0   Total Score 0       Fall Risk Screen:  DENILSON Fall Risk Assessment was completed, and patient is at LOW risk for falls.Assessment completed on:7/1/2020    Health Habits and Functional and Cognitive Screening:  Functional & Cognitive Status 7/1/2020   Do you have difficulty preparing food and eating? No   Do you have difficulty bathing yourself, getting dressed or grooming yourself? No   Do you have difficulty using the toilet? No   Do you have difficulty moving around from place to place? No   Do you have trouble with steps or getting out of a bed or a chair? No   Current Diet Well Balanced Diet   Dental Exam Up to date   Eye Exam Up to date   Exercise (times per week) 0 times per week   Current Exercise Activities Include None   Do you need help using the phone?  No   Are  you deaf or do you have serious difficulty hearing?  No   Do you need help with transportation? No   Do you need help shopping? No   Do you need help preparing meals?  No   Do you need help with housework?  No   Do you need help with laundry? No   Do you need help taking your medications? No   Do you need help managing money? No   Do you ever drive or ride in a car without wearing a seat belt? No   Have you felt unusual stress, anger or loneliness in the last month? No   Who do you live with? Spouse   If you need help, do you have trouble finding someone available to you? No   Have you been bothered in the last four weeks by sexual problems? No   Do you have difficulty concentrating, remembering or making decisions? No         Does the patient have evidence of cognitive impairment? No  MMSE done 30/30  Asprin use counseling:Does not need ASA (and currently is not on it)    Age-appropriate Screening Schedule:  Refer to the list below for future screening recommendations based on patient's age, sex and/or medical conditions. Orders for these recommended tests are listed in the plan section. The patient has been provided with a written plan.    Health Maintenance   Topic Date Due   • ZOSTER VACCINE (1 of 2) 10/20/1992   • DIABETIC EYE EXAM  05/20/2020   • INFLUENZA VACCINE  08/01/2020   • HEMOGLOBIN A1C  01/02/2021   • URINE MICROALBUMIN  07/02/2021   • TDAP/TD VACCINES (2 - Tdap) 10/28/2029          The following portions of the patient's history were reviewed and updated as appropriate: allergies, current medications, past family history, past medical history, past social history, past surgical history and problem list.    Outpatient Medications Prior to Visit   Medication Sig Dispense Refill   • Alcohol Swabs (B-D SINGLE USE SWABS REGULAR) pads 1 swab Daily. 100 each 1   • amLODIPine (NORVASC) 5 MG tablet TAKE 1 TABLET DAILY FOR BLOOD PRESSURE 90 tablet 3   • Blood Glucose Calibration (TRUE METRIX LEVEL 2) Normal  solution 1 dose by In Vitro route Every 30 (Thirty) Days. 1 each 1   • Blood Glucose Monitoring Suppl (TRUE METRIX AIR GLUCOSE METER) w/Device kit 1 strip Daily. 1 kit 0   • glucose blood (TRUE METRIX BLOOD GLUCOSE TEST) test strip Test bs once a day (true metrix) 100 each 1   • TRUEPLUS LANCETS 30G misc Test BS once a day 100 each 1   • glimepiride (AMARYL) 4 MG tablet TAKE 1 TABLET TWICE A  tablet 2   • benzonatate (TESSALON PERLES) 100 MG capsule Take 1 capsule by mouth 3 (Three) Times a Day As Needed for Cough. 30 capsule 0   • Chlorcyclizine-Pseudoephed (STAHIST AD) 25-60 MG tablet Take 1 tablet by mouth 2 (Two) Times a Day. 42 tablet 0     No facility-administered medications prior to visit.        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 Patient has an advance directive (not in EMR), copy requested.    Review of Systems   Constitutional: Negative.    Respiratory: Negative.    Cardiovascular: Negative.    Gastrointestinal: Negative for nausea.   Endocrine: Negative.    Neurological: Negative for dizziness, weakness, light-headedness and headaches.   Hematological: Negative.    Psychiatric/Behavioral: Negative.        Compared to one year ago, the patient feels her physical health is the same.  Compared to one year ago, the patient feels her mental health is the same.    Reviewed chart for potential of high risk medication  "in the elderly: yes  Reviewed chart for potential of harmful drug interactions in the elderly:yes    Objective         Vitals:    07/01/20 1331   BP: 154/77   BP Location: Left arm   Patient Position: Sitting   Cuff Size: Adult   Pulse: 82   Temp: 97.3 °F (36.3 °C)   TempSrc: Temporal   SpO2: 99%   Weight: 58.1 kg (128 lb)   Height: 157.5 cm (62\")       Body mass index is 23.41 kg/m².  Discussed the patient's BMI with her. The BMI is in the acceptable range.    Physical Exam   Constitutional: She is oriented to person, place, and time. She appears well-developed and well-nourished.   HENT:   Head: Normocephalic and atraumatic.   Neck: No JVD present.   Cardiovascular: Normal rate, regular rhythm, normal heart sounds and intact distal pulses.   No murmur heard.  Pulmonary/Chest: Effort normal and breath sounds normal. No respiratory distress.   Musculoskeletal: She exhibits no edema.   Neurological: She is alert and oriented to person, place, and time.   Skin: Skin is warm and dry. No rash noted.   Psychiatric: She has a normal mood and affect.   Nursing note and vitals reviewed.      Lab Results   Component Value Date    TRIG 98 07/02/2020    HDL 44 07/02/2020     (H) 07/02/2020    VLDL 19.6 07/02/2020    HGBA1C 7.3 (H) 07/02/2020        Assessment/Plan   Medicare Risks and Personalized Health Plan  CMS Preventative Services Quick Reference  Immunizations Discussed/Encouraged (specific immunizations; Influenza )    The above risks/problems have been discussed with the patient.  Pertinent information has been shared with the patient in the After Visit Summary.  Follow up plans and orders are seen below in the Assessment/Plan Section.    Diagnoses and all orders for this visit:    1. Preventative health care (Primary)    2. Essential hypertension  -     Cancel: Comprehensive Metabolic Panel  -     Lipid Panel; Future    3. Microalbuminuria  -     MicroAlbumin, Urine, Random - Urine, Clean Catch; Future    4. " Type 2 diabetes mellitus without complication, without long-term current use of insulin (CMS/[facility])  -     Cancel: Comprehensive Metabolic Panel  -     Lipid Panel; Future  -     Cancel: Hemoglobin A1c  -     MicroAlbumin, Urine, Random - Urine, Clean Catch; Future    5. Need for hepatitis C screening test  -     Hepatitis C Antibody; Future    Other orders  -     glimepiride (AMARYL) 4 MG tablet; Take 1 tablet by mouth 2 (Two) Times a Day.  Dispense: 180 tablet; Refill: 3      Follow Up:  Return in about 6 months (around 1/1/2021).     An After Visit Summary and PPPS were given to the patient.   she is UTD on vaccines  Counseled on the need for flu shot this Fall  She will continue current meds  Labs ordered  Counseled on the need for yearly eye exam and dietary compliance

## 2020-07-02 ENCOUNTER — LAB (OUTPATIENT)
Dept: FAMILY MEDICINE CLINIC | Facility: CLINIC | Age: 80
End: 2020-07-02

## 2020-07-02 DIAGNOSIS — R80.9 MICROALBUMINURIA: ICD-10-CM

## 2020-07-02 DIAGNOSIS — I10 ESSENTIAL HYPERTENSION: ICD-10-CM

## 2020-07-02 DIAGNOSIS — E11.9 TYPE 2 DIABETES MELLITUS WITHOUT COMPLICATION, WITHOUT LONG-TERM CURRENT USE OF INSULIN (HCC): ICD-10-CM

## 2020-07-02 DIAGNOSIS — Z11.59 NEED FOR HEPATITIS C SCREENING TEST: ICD-10-CM

## 2020-07-02 LAB
ALBUMIN UR-MCNC: <1.2 MG/DL
CHOLEST SERPL-MCNC: 175 MG/DL (ref 0–200)
HBA1C MFR BLD: 7.3 % (ref 3.5–5.6)
HCV AB SER DONR QL: NORMAL
HDLC SERPL-MCNC: 44 MG/DL (ref 40–60)
LDLC SERPL CALC-MCNC: 111 MG/DL (ref 0–100)
LDLC/HDLC SERPL: 2.53 {RATIO}
TRIGL SERPL-MCNC: 98 MG/DL (ref 0–150)
VLDLC SERPL-MCNC: 19.6 MG/DL (ref 5–40)

## 2020-07-02 PROCEDURE — 36415 COLL VENOUS BLD VENIPUNCTURE: CPT

## 2020-07-02 PROCEDURE — 86803 HEPATITIS C AB TEST: CPT | Performed by: FAMILY MEDICINE

## 2020-07-02 PROCEDURE — 80061 LIPID PANEL: CPT | Performed by: FAMILY MEDICINE

## 2020-07-02 PROCEDURE — 83036 HEMOGLOBIN GLYCOSYLATED A1C: CPT | Performed by: FAMILY MEDICINE

## 2020-07-02 PROCEDURE — 80053 COMPREHEN METABOLIC PANEL: CPT | Performed by: FAMILY MEDICINE

## 2020-07-02 PROCEDURE — 82043 UR ALBUMIN QUANTITATIVE: CPT | Performed by: FAMILY MEDICINE

## 2020-07-03 LAB
ALBUMIN SERPL-MCNC: 4.3 G/DL (ref 3.5–5.2)
ALBUMIN/GLOB SERPL: 1.4 G/DL
ALP SERPL-CCNC: 92 U/L (ref 39–117)
ALT SERPL W P-5'-P-CCNC: 23 U/L (ref 1–33)
ANION GAP SERPL CALCULATED.3IONS-SCNC: 10.7 MMOL/L (ref 5–15)
AST SERPL-CCNC: 18 U/L (ref 1–32)
BILIRUB SERPL-MCNC: 0.4 MG/DL (ref 0.2–1.2)
BUN SERPL-MCNC: 13 MG/DL (ref 8–23)
BUN/CREAT SERPL: 15.9 (ref 7–25)
CALCIUM SPEC-SCNC: 9.9 MG/DL (ref 8.6–10.5)
CHLORIDE SERPL-SCNC: 101 MMOL/L (ref 98–107)
CO2 SERPL-SCNC: 26.3 MMOL/L (ref 22–29)
CREAT SERPL-MCNC: 0.82 MG/DL (ref 0.57–1)
GFR SERPL CREATININE-BSD FRML MDRD: 68 ML/MIN/1.73
GLOBULIN UR ELPH-MCNC: 3 GM/DL
GLUCOSE SERPL-MCNC: 191 MG/DL (ref 65–99)
POTASSIUM SERPL-SCNC: 4.9 MMOL/L (ref 3.5–5.2)
PROT SERPL-MCNC: 7.3 G/DL (ref 6–8.5)
SODIUM SERPL-SCNC: 138 MMOL/L (ref 136–145)

## 2020-07-04 PROBLEM — Z00.00 PREVENTATIVE HEALTH CARE: Status: ACTIVE | Noted: 2020-07-04

## 2021-01-05 ENCOUNTER — LAB (OUTPATIENT)
Dept: FAMILY MEDICINE CLINIC | Facility: CLINIC | Age: 81
End: 2021-01-05

## 2021-01-05 ENCOUNTER — OFFICE VISIT (OUTPATIENT)
Dept: FAMILY MEDICINE CLINIC | Facility: CLINIC | Age: 81
End: 2021-01-05

## 2021-01-05 VITALS
BODY MASS INDEX: 23.37 KG/M2 | WEIGHT: 127 LBS | OXYGEN SATURATION: 100 % | HEART RATE: 79 BPM | SYSTOLIC BLOOD PRESSURE: 149 MMHG | DIASTOLIC BLOOD PRESSURE: 81 MMHG | HEIGHT: 62 IN | TEMPERATURE: 96.6 F

## 2021-01-05 DIAGNOSIS — I10 ESSENTIAL HYPERTENSION: ICD-10-CM

## 2021-01-05 DIAGNOSIS — E11.42 TYPE 2 DIABETES MELLITUS WITH DIABETIC POLYNEUROPATHY, WITHOUT LONG-TERM CURRENT USE OF INSULIN (HCC): Primary | ICD-10-CM

## 2021-01-05 DIAGNOSIS — N81.10 FEMALE BLADDER PROLAPSE: ICD-10-CM

## 2021-01-05 DIAGNOSIS — E11.42 TYPE 2 DIABETES MELLITUS WITH DIABETIC POLYNEUROPATHY, WITHOUT LONG-TERM CURRENT USE OF INSULIN (HCC): ICD-10-CM

## 2021-01-05 DIAGNOSIS — M54.50 ACUTE MIDLINE LOW BACK PAIN WITHOUT SCIATICA: ICD-10-CM

## 2021-01-05 PROBLEM — E11.9 TYPE 2 DIABETES MELLITUS, WITHOUT LONG-TERM CURRENT USE OF INSULIN (HCC): Status: ACTIVE | Noted: 2021-01-05

## 2021-01-05 LAB
ANION GAP SERPL CALCULATED.3IONS-SCNC: 9.5 MMOL/L (ref 5–15)
BUN SERPL-MCNC: 13 MG/DL (ref 8–23)
BUN/CREAT SERPL: 17.6 (ref 7–25)
CALCIUM SPEC-SCNC: 9.6 MG/DL (ref 8.6–10.5)
CHLORIDE SERPL-SCNC: 103 MMOL/L (ref 98–107)
CO2 SERPL-SCNC: 26.5 MMOL/L (ref 22–29)
CREAT SERPL-MCNC: 0.74 MG/DL (ref 0.57–1)
GFR SERPL CREATININE-BSD FRML MDRD: 76 ML/MIN/1.73
GLUCOSE SERPL-MCNC: 86 MG/DL (ref 65–99)
HBA1C MFR BLD: 7.6 % (ref 3.5–5.6)
POTASSIUM SERPL-SCNC: 4.7 MMOL/L (ref 3.5–5.2)
SODIUM SERPL-SCNC: 139 MMOL/L (ref 136–145)

## 2021-01-05 PROCEDURE — 83036 HEMOGLOBIN GLYCOSYLATED A1C: CPT | Performed by: FAMILY MEDICINE

## 2021-01-05 PROCEDURE — 99213 OFFICE O/P EST LOW 20 MIN: CPT | Performed by: FAMILY MEDICINE

## 2021-01-05 PROCEDURE — 80048 BASIC METABOLIC PNL TOTAL CA: CPT | Performed by: FAMILY MEDICINE

## 2021-01-05 PROCEDURE — 36415 COLL VENOUS BLD VENIPUNCTURE: CPT | Performed by: FAMILY MEDICINE

## 2021-01-05 NOTE — PROGRESS NOTES
Subjective   Raul Howell is a 80 y.o. female.     Here for follow up on bp and dm  Also c/o low back pain for 3 weeks  At its worst it is a 4/10  Not every day   No falls  Intermittent  C/o numbness in both feet; no pain ; intermittent  Feels like her cystocele is worse  Denies incontinence or difficulty voiding         The following portions of the patient's history were reviewed and updated as appropriate: allergies, current medications, past family history, past medical history, past social history, past surgical history and problem list.  Past Medical History:   Diagnosis Date   • Arthritis    • Diabetes (CMS/HCC)    • Melanoma (CMS/HCC)      Past Surgical History:   Procedure Laterality Date   • BLADDER SURGERY      2x   • KNEE SURGERY      right total knee   • WISDOM TOOTH EXTRACTION       Family History   Problem Relation Age of Onset   • Breast cancer Daughter      Social History     Socioeconomic History   • Marital status:      Spouse name: Not on file   • Number of children: Not on file   • Years of education: Not on file   • Highest education level: Not on file   Tobacco Use   • Smoking status: Never Smoker   • Smokeless tobacco: Never Used   Substance and Sexual Activity   • Alcohol use: No     Frequency: Never   • Drug use: No         Current Outpatient Medications:   •  Alcohol Swabs (B-D SINGLE USE SWABS REGULAR) pads, 1 swab Daily., Disp: 100 each, Rfl: 1  •  amLODIPine (NORVASC) 5 MG tablet, TAKE 1 TABLET DAILY FOR BLOOD PRESSURE, Disp: 90 tablet, Rfl: 3  •  Blood Glucose Calibration (TRUE METRIX LEVEL 2) Normal solution, 1 dose by In Vitro route Every 30 (Thirty) Days., Disp: 1 each, Rfl: 1  •  Blood Glucose Monitoring Suppl (TRUE METRIX AIR GLUCOSE METER) w/Device kit, 1 strip Daily., Disp: 1 kit, Rfl: 0  •  glimepiride (AMARYL) 4 MG tablet, Take 1 tablet by mouth 2 (Two) Times a Day., Disp: 180 tablet, Rfl: 3  •  glucose blood (TRUE METRIX BLOOD GLUCOSE TEST) test strip, Test bs once a  "day (true metrix), Disp: 100 each, Rfl: 1  •  TRUEPLUS LANCETS 30G misc, Test BS once a day, Disp: 100 each, Rfl: 1    Review of Systems   Constitutional: Negative for diaphoresis, fatigue, fever, unexpected weight gain and unexpected weight loss.   Respiratory: Negative for cough, chest tightness and shortness of breath.    Cardiovascular: Negative for chest pain, palpitations and leg swelling.   Gastrointestinal: Negative for nausea and vomiting.   Endocrine: Negative.    Genitourinary: Negative for dysuria, frequency, pelvic pain, urgency and urinary incontinence.   Neurological: Positive for numbness. Negative for dizziness, syncope and headache.     /81 (BP Location: Left arm, Patient Position: Sitting, Cuff Size: Adult)   Pulse 79   Temp 96.6 °F (35.9 °C) (Temporal)   Ht 157.5 cm (62\")   Wt 57.6 kg (127 lb)   SpO2 100%   Breastfeeding No   BMI 23.23 kg/m²       Objective   Physical Exam  Vitals signs and nursing note reviewed.   Constitutional:       General: She is not in acute distress.     Appearance: Normal appearance. She is well-developed, well-groomed and normal weight.   HENT:      Head: Normocephalic and atraumatic.   Neck:      Musculoskeletal: Neck supple.      Thyroid: No thyromegaly.      Vascular: No JVD.   Cardiovascular:      Rate and Rhythm: Normal rate and regular rhythm.      Pulses:           Dorsalis pedis pulses are 1+ on the right side and 1+ on the left side.        Posterior tibial pulses are 1+ on the right side and 1+ on the left side.      Heart sounds: Normal heart sounds. No murmur. No friction rub. No gallop.    Pulmonary:      Effort: Pulmonary effort is normal. No respiratory distress.      Breath sounds: Normal breath sounds. No wheezing or rales.   Musculoskeletal:      Lumbar back: Normal.      Right lower leg: No edema.      Left lower leg: No edema.      Right foot: Normal range of motion. Bunion present. No deformity, Charcot foot or foot drop.      Left " foot: Normal range of motion. Bunion present. No deformity, Charcot foot or foot drop.   Feet:      Right foot:      Skin integrity: Callus and dry skin present. No ulcer, blister, skin breakdown, erythema or warmth.      Toenail Condition: Right toenails are abnormally thick.      Left foot:      Skin integrity: Callus and dry skin present. No ulcer, blister, skin breakdown, erythema or warmth.      Toenail Condition: Left toenails are abnormally thick.   Lymphadenopathy:      Cervical: No cervical adenopathy.   Skin:     General: Skin is warm and dry.   Neurological:      Mental Status: She is alert.   Psychiatric:         Behavior: Behavior is cooperative.       Diabetic Foot Exam Performed      Assessment/Plan   Problems Addressed this Visit        Cardiac and Vasculature    Essential hypertension       Endocrine and Metabolic    Type 2 diabetes mellitus, without long-term current use of insulin (CMS/[facility]) - Primary    Relevant Orders    Basic Metabolic Panel    Hemoglobin A1c       Genitourinary and Reproductive     Female bladder prolapse      Other Visit Diagnoses     Acute midline low back pain without sciatica          Diagnoses       Codes Comments    Type 2 diabetes mellitus with diabetic polyneuropathy, without long-term current use of insulin (CMS/[facility])    -  Primary ICD-10-CM: E11.42  ICD-9-CM: 250.60, 357.2     Essential hypertension     ICD-10-CM: I10  ICD-9-CM: 401.9     Acute midline low back pain without sciatica     ICD-10-CM: M54.5  ICD-9-CM: 724.2     Female bladder prolapse     ICD-10-CM: N81.10  ICD-9-CM: 618.01         Reassured on her back pain; she will take tylenol if needed for the pain  She has developed mild neuropathy from her dm  Gave her a handout   Counseled her on the need to wear shoes and check her feet daily  Labs ordered  She refused referral to her urologist at this time  She refused mammogram at this time sec to current fears related to covid-19  20 min spent

## 2021-01-05 NOTE — PATIENT INSTRUCTIONS
No fried foods and limit pasta, bread, and sweets.  Call when ready for mammogram  Use tylenol as needed for back/joint pain  Always wear shoes  Eat healthier evening snacks  Diabetic Neuropathy  Diabetic neuropathy refers to nerve damage that is caused by diabetes (diabetes mellitus). Over time, people with diabetes can develop nerve damage throughout the body. There are several types of diabetic neuropathy:  · Peripheral neuropathy. This is the most common type of diabetic neuropathy. It causes damage to nerves that carry signals between the spinal cord and other parts of the body (peripheral nerves). This usually affects nerves in the feet and legs first, and may eventually affect the hands and arms. The damage affects the ability to sense touch or temperature.  · Autonomic neuropathy. This type causes damage to nerves that control involuntary functions (autonomic nerves). These nerves carry signals that control:  ? Heartbeat.  ? Body temperature.  ? Blood pressure.  ? Urination.  ? Digestion.  ? Sweating.  ? Sexual function.  ? Response to changing blood sugar (glucose) levels.  · Focal neuropathy. This type of nerve damage affects one area of the body, such as an arm, a leg, or the face. The injury may involve one nerve or a small group of nerves. Focal neuropathy can be painful and unpredictable, and occurs most often in older adults with diabetes. This often develops suddenly, but usually improves over time and does not cause long-term problems.  · Proximal neuropathy. This type of nerve damage affects the nerves of the thighs, hips, buttocks, or legs. It causes severe pain, weakness, and muscle death (atrophy), usually in the thigh muscles. It is more common among older men and people who have type 2 diabetes. The length of recovery time may vary.  What are the causes?  Peripheral, autonomic, and focal neuropathies are caused by diabetes that is not well controlled with treatment. The cause of proximal  neuropathy is not known, but it may be caused by inflammation related to uncontrolled blood glucose levels.  What are the signs or symptoms?  Peripheral neuropathy  Peripheral neuropathy develops slowly over time. When the nerves of the feet and legs no longer work, you may experience:  · Burning, stabbing, or aching pain in the legs or feet.  · Pain or cramping in the legs or feet.  · Loss of feeling (numbness) and inability to feel pressure or pain in the feet. This can lead to:  ? Thick calluses or sores on areas of constant pressure.  ? Ulcers.  ? Reduced ability to feel temperature changes.  · Foot deformities.  · Muscle weakness.  · Loss of balance or coordination.  Autonomic neuropathy  The symptoms of autonomic neuropathy vary depending on which nerves are affected. Symptoms may include:  · Problems with digestion, such as:  ? Nausea or vomiting.  ? Poor appetite.  ? Bloating.  ? Diarrhea or constipation.  ? Trouble swallowing.  ? Losing weight without trying to.  · Problems with the heart, blood and lungs, such as:  ? Dizziness, especially when standing up.  ? Fainting.  ? Shortness of breath.  ? Irregular heartbeat.  · Bladder problems, such as:  ? Trouble starting or stopping urination.  ? Leaking urine.  ? Trouble emptying the bladder.  ? Urinary tract infections (UTIs).  · Problems with other body functions, such as:  ? Sweat. You may sweat too much or too little.  ? Temperature. You might get hot easily. Or, you might feel cold more than usual.  ? Sexual function. Men may not be able to get or maintain an erection. Women may have vaginal dryness and difficulty with arousal.  Focal neuropathy  Symptoms affect only one area of the body. Common symptoms include:  · Numbness.  · Tingling.  · Burning pain.  · Prickling feeling.  · Very sensitive skin.  · Weakness.  · Inability to move (paralysis).  · Muscle twitching.  · Muscles getting smaller (wasting).  · Poor coordination.  · Double or blurred  vision.  Proximal neuropathy  · Sudden, severe pain in the hip, thigh, or buttocks. Pain may spread from the back into the legs (sciatica).  · Pain and numbness in the arms and legs.  · Tingling.  · Loss of bladder control or bowel control.  · Weakness and wasting of thigh muscles.  · Difficulty getting up from a seated position.  · Abdominal swelling.  · Unexplained weight loss.  How is this diagnosed?  Diagnosis usually involves reviewing your medical history and any symptoms you have. Diagnosis varies depending on the type of neuropathy your health care provider suspects.  Peripheral neuropathy  Your health care provider will check areas that are affected by your nervous system (neurologic exam), such as your reflexes, how you move, and what you can feel. You may have other tests, such as:  · Blood tests.  · Removal and examination of fluid that surrounds the spinal cord (lumbar puncture).  · CT scan.  · MRI.  · A test to check the nerves that control muscles (electromyogram, EMG).  · Tests of how quickly messages pass through your nerves (nerve conduction velocity tests).  · Removal of a small piece of nerve to be examined under a microscope (biopsy).  Autonomic neuropathy  You may have tests, such as:  · Tests to measure your blood pressure and heart rate. This may include monitoring you while you are safely secured to an exam table that moves you from a lying position to an upright position (table tilt test).  · Breathing tests to check your lungs.  · Tests to check how food moves through the digestive system (gastric emptying tests).  · Blood, sweat, or urine tests.  · Ultrasound of your bladder.  · Spinal fluid tests.  Focal neuropathy  This condition may be diagnosed with:  · A neurologic exam.  · CT scan.  · MRI.  · EMG.  · Nerve conduction velocity tests.  Proximal neuropathy  There is no test to diagnose this type of neuropathy. You may have tests to rule out other possible causes of this type of  neuropathy. Tests may include:  · X-rays of your spine and lumbar region.  · Lumbar puncture.  · MRI.  How is this treated?  The goal of treatment is to keep nerve damage from getting worse. The most important part of treatment is keeping your blood glucose level and your A1C level within your target range by following your diabetes management plan. Over time, maintaining lower blood glucose levels helps lessen symptoms. In some cases, you may need prescription pain medicine.  Follow these instructions at home:    Lifestyle    · Do not use any products that contain nicotine or tobacco, such as cigarettes and e-cigarettes. If you need help quitting, ask your health care provider.  · Be physically active every day. Include strength training and balance exercises.  · Follow a healthy meal plan.  · Work with your health care provider to manage your blood pressure.  General instructions  · Follow your diabetes management plan as directed.  ? Check your blood glucose levels as directed by your health care provider.  ? Keep your blood glucose in your target range as directed by your health care provider.  ? Have your A1C level checked at least two times a year, or as often as told by your health care provider.  · Take over the counter and prescription medicines only as told by your health care provider. This includes insulin and diabetes medicine.  · Do not drive or use heavy machinery while taking prescription pain medicines.  · Check your skin and feet every day for cuts, bruises, redness, blisters, or sores.  · Keep all follow up visits as told by your health care provider. This is important.  Contact a health care provider if:  · You have burning, stabbing, or aching pain in your legs or feet.  · You are unable to feel pressure or pain in your feet.  · You develop problems with digestion, such as:  ? Nausea.  ? Vomiting.  ? Bloating.  ? Constipation.  ? Diarrhea.  ? Abdominal pain.  · You have difficulty with  urination, such as inability:  ? To control when you urinate (incontinence).  ? To completely empty the bladder (retention).  · You have palpitations.  · You feel dizzy, weak, or faint when you stand up.  Get help right away if:  · You cannot urinate.  · You have sudden weakness or loss of coordination.  · You have trouble speaking.  · You have pain or pressure in your chest.  · You have an irregular heart beat.  · You have sudden inability to move a part of your body.  Summary  · Diabetic neuropathy refers to nerve damage that is caused by diabetes. It can affect nerves throughout the entire body, causing numbness and pain in the arms, legs, digestive tract, heart, and other body systems.  · Keep your blood glucose level and your blood pressure in your target range, as directed by your health care provider. This can help prevent neuropathy from getting worse.  · Check your skin and feet every day for cuts, bruises, redness, blisters, or sores.  · Do not use any products that contain nicotine or tobacco, such as cigarettes and e-cigarettes. If you need help quitting, ask your health care provider.  This information is not intended to replace advice given to you by your health care provider. Make sure you discuss any questions you have with your health care provider.  Document Revised: 01/30/2019 Document Reviewed: 01/22/2018  [employer] Patient Education © 2020 [employer]

## 2021-03-23 ENCOUNTER — CLINICAL SUPPORT (OUTPATIENT)
Dept: FAMILY MEDICINE CLINIC | Facility: CLINIC | Age: 81
End: 2021-03-23

## 2021-03-23 DIAGNOSIS — R10.2 VAGINAL PAIN: Primary | ICD-10-CM

## 2021-03-23 DIAGNOSIS — R82.81 PYURIA: ICD-10-CM

## 2021-03-23 LAB
BILIRUB BLD-MCNC: NEGATIVE MG/DL
CLARITY, POC: CLEAR
COLOR UR: YELLOW
GLUCOSE UR STRIP-MCNC: NEGATIVE MG/DL
KETONES UR QL: NEGATIVE
LEUKOCYTE EST, POC: ABNORMAL
NITRITE UR-MCNC: NEGATIVE MG/ML
PH UR: 6 [PH] (ref 5–8)
PROT UR STRIP-MCNC: NEGATIVE MG/DL
RBC # UR STRIP: NEGATIVE /UL
SP GR UR: 1.01 (ref 1–1.03)
UROBILINOGEN UR QL: NORMAL

## 2021-03-23 PROCEDURE — 87086 URINE CULTURE/COLONY COUNT: CPT | Performed by: FAMILY MEDICINE

## 2021-03-23 PROCEDURE — 81003 URINALYSIS AUTO W/O SCOPE: CPT | Performed by: FAMILY MEDICINE

## 2021-03-23 RX ORDER — SULFAMETHOXAZOLE AND TRIMETHOPRIM 800; 160 MG/1; MG/1
1 TABLET ORAL 2 TIMES DAILY
Qty: 14 TABLET | Refills: 0 | Status: SHIPPED | OUTPATIENT
Start: 2021-03-23 | End: 2021-07-06

## 2021-03-24 LAB — BACTERIA SPEC AEROBE CULT: NO GROWTH

## 2021-05-27 RX ORDER — AMLODIPINE BESYLATE 5 MG/1
TABLET ORAL
Qty: 90 TABLET | Refills: 1 | Status: SHIPPED | OUTPATIENT
Start: 2021-05-27 | End: 2021-12-01

## 2021-06-08 RX ORDER — GLIMEPIRIDE 4 MG/1
TABLET ORAL
Qty: 180 TABLET | Refills: 1 | Status: SHIPPED | OUTPATIENT
Start: 2021-06-08 | End: 2021-11-15

## 2021-07-06 ENCOUNTER — LAB (OUTPATIENT)
Dept: FAMILY MEDICINE CLINIC | Facility: CLINIC | Age: 81
End: 2021-07-06

## 2021-07-06 ENCOUNTER — OFFICE VISIT (OUTPATIENT)
Dept: FAMILY MEDICINE CLINIC | Facility: CLINIC | Age: 81
End: 2021-07-06

## 2021-07-06 VITALS
TEMPERATURE: 97.5 F | WEIGHT: 126 LBS | SYSTOLIC BLOOD PRESSURE: 132 MMHG | HEIGHT: 62 IN | DIASTOLIC BLOOD PRESSURE: 78 MMHG | HEART RATE: 71 BPM | OXYGEN SATURATION: 97 % | BODY MASS INDEX: 23.19 KG/M2

## 2021-07-06 DIAGNOSIS — R30.0 DYSURIA: ICD-10-CM

## 2021-07-06 DIAGNOSIS — I10 ESSENTIAL HYPERTENSION: ICD-10-CM

## 2021-07-06 DIAGNOSIS — E11.42 TYPE 2 DIABETES MELLITUS WITH DIABETIC POLYNEUROPATHY, WITHOUT LONG-TERM CURRENT USE OF INSULIN (HCC): ICD-10-CM

## 2021-07-06 DIAGNOSIS — Z80.3 FAMILY HISTORY OF BREAST CANCER IN FIRST DEGREE RELATIVE: ICD-10-CM

## 2021-07-06 DIAGNOSIS — Z00.00 PREVENTATIVE HEALTH CARE: Primary | ICD-10-CM

## 2021-07-06 DIAGNOSIS — Z12.31 BREAST CANCER SCREENING BY MAMMOGRAM: ICD-10-CM

## 2021-07-06 DIAGNOSIS — Z78.0 POSTMENOPAUSAL STATUS: ICD-10-CM

## 2021-07-06 PROBLEM — R03.0 ELEVATED BLOOD PRESSURE READING WITHOUT DIAGNOSIS OF HYPERTENSION: Status: RESOLVED | Noted: 2018-10-28 | Resolved: 2021-07-06

## 2021-07-06 LAB
ALBUMIN SERPL-MCNC: 4.3 G/DL (ref 3.5–5.2)
ALBUMIN UR-MCNC: <1.2 MG/DL
ALBUMIN/GLOB SERPL: 1.5 G/DL
ALP SERPL-CCNC: 108 U/L (ref 39–117)
ALT SERPL W P-5'-P-CCNC: 19 U/L (ref 1–33)
ANION GAP SERPL CALCULATED.3IONS-SCNC: 10.3 MMOL/L (ref 5–15)
AST SERPL-CCNC: 15 U/L (ref 1–32)
BACTERIA UR QL AUTO: ABNORMAL /HPF
BILIRUB SERPL-MCNC: 0.5 MG/DL (ref 0–1.2)
BILIRUB UR QL STRIP: NEGATIVE
BUN SERPL-MCNC: 15 MG/DL (ref 8–23)
BUN/CREAT SERPL: 18.8 (ref 7–25)
CALCIUM SPEC-SCNC: 9.3 MG/DL (ref 8.6–10.5)
CHLORIDE SERPL-SCNC: 104 MMOL/L (ref 98–107)
CHOLEST SERPL-MCNC: 184 MG/DL (ref 0–200)
CLARITY UR: CLEAR
CO2 SERPL-SCNC: 26.7 MMOL/L (ref 22–29)
COLOR UR: YELLOW
CREAT SERPL-MCNC: 0.8 MG/DL (ref 0.57–1)
GFR SERPL CREATININE-BSD FRML MDRD: 69 ML/MIN/1.73
GLOBULIN UR ELPH-MCNC: 2.8 GM/DL
GLUCOSE SERPL-MCNC: 187 MG/DL (ref 65–99)
GLUCOSE UR STRIP-MCNC: NEGATIVE MG/DL
HBA1C MFR BLD: 7.7 % (ref 3.5–5.6)
HDLC SERPL-MCNC: 46 MG/DL (ref 40–60)
HGB UR QL STRIP.AUTO: NEGATIVE
HYALINE CASTS UR QL AUTO: ABNORMAL /LPF
KETONES UR QL STRIP: NEGATIVE
LDLC SERPL CALC-MCNC: 117 MG/DL (ref 0–100)
LDLC/HDLC SERPL: 2.5 {RATIO}
LEUKOCYTE ESTERASE UR QL STRIP.AUTO: ABNORMAL
NITRITE UR QL STRIP: NEGATIVE
PH UR STRIP.AUTO: 5.5 [PH] (ref 5–8)
POTASSIUM SERPL-SCNC: 5 MMOL/L (ref 3.5–5.2)
PROT SERPL-MCNC: 7.1 G/DL (ref 6–8.5)
PROT UR QL STRIP: NEGATIVE
RBC # UR: ABNORMAL /HPF
REF LAB TEST METHOD: ABNORMAL
SODIUM SERPL-SCNC: 141 MMOL/L (ref 136–145)
SP GR UR STRIP: 1.02 (ref 1–1.03)
SQUAMOUS #/AREA URNS HPF: ABNORMAL /HPF
TRIGL SERPL-MCNC: 115 MG/DL (ref 0–150)
UROBILINOGEN UR QL STRIP: ABNORMAL
VLDLC SERPL-MCNC: 21 MG/DL (ref 5–40)
WBC UR QL AUTO: ABNORMAL /HPF

## 2021-07-06 PROCEDURE — 36415 COLL VENOUS BLD VENIPUNCTURE: CPT | Performed by: FAMILY MEDICINE

## 2021-07-06 PROCEDURE — 81001 URINALYSIS AUTO W/SCOPE: CPT

## 2021-07-06 PROCEDURE — 1126F AMNT PAIN NOTED NONE PRSNT: CPT | Performed by: FAMILY MEDICINE

## 2021-07-06 PROCEDURE — 82043 UR ALBUMIN QUANTITATIVE: CPT | Performed by: FAMILY MEDICINE

## 2021-07-06 PROCEDURE — 1159F MED LIST DOCD IN RCRD: CPT | Performed by: FAMILY MEDICINE

## 2021-07-06 PROCEDURE — G0439 PPPS, SUBSEQ VISIT: HCPCS | Performed by: FAMILY MEDICINE

## 2021-07-06 PROCEDURE — 80053 COMPREHEN METABOLIC PANEL: CPT | Performed by: FAMILY MEDICINE

## 2021-07-06 PROCEDURE — 80061 LIPID PANEL: CPT | Performed by: FAMILY MEDICINE

## 2021-07-06 PROCEDURE — 99213 OFFICE O/P EST LOW 20 MIN: CPT | Performed by: FAMILY MEDICINE

## 2021-07-06 PROCEDURE — 1170F FXNL STATUS ASSESSED: CPT | Performed by: FAMILY MEDICINE

## 2021-07-06 PROCEDURE — 83036 HEMOGLOBIN GLYCOSYLATED A1C: CPT | Performed by: FAMILY MEDICINE

## 2021-07-06 PROCEDURE — 96160 PT-FOCUSED HLTH RISK ASSMT: CPT | Performed by: FAMILY MEDICINE

## 2021-07-06 NOTE — PATIENT INSTRUCTIONS
Talk to the pharmacist about the shingles vaccine (SHINGRIX)  Advance Directive    Advance directives are legal documents that let you make choices ahead of time about your health care and medical treatment in case you become unable to communicate for yourself. Advance directives are a way for you to make known your wishes to family, friends, and health care providers. This can let others know about your end-of-life care if you become unable to communicate.  Discussing and writing advance directives should happen over time rather than all at once. Advance directives can be changed depending on your situation and what you want, even after you have signed the advance directives.  There are different types of advance directives, such as:  · Medical power of .  · Living will.  · Do not resuscitate (DNR) or do not attempt resuscitation (DNAR) order.  Health care proxy and medical power of   A health care proxy is also called a health care agent. This is a person who is appointed to make medical decisions for you in cases where you are unable to make the decisions yourself. Generally, people choose someone they know well and trust to represent their preferences. Make sure to ask this person for an agreement to act as your proxy. A proxy may have to exercise judgment in the event of a medical decision for which your wishes are not known.  A medical power of  is a legal document that names your health care proxy. Depending on the laws in your state, after the document is written, it may also need to be:  · Signed.  · Notarized.  · Dated.  · Copied.  · Witnessed.  · Incorporated into your medical record.  You may also want to appoint someone to manage your money in a situation in which you are unable to do so. This is called a durable power of  for finances. It is a separate legal document from the durable power of  for health care. You may choose the same person or someone different  from your health care proxy to act as your agent in money matters.  If you do not appoint a proxy, or if there is a concern that the proxy is not acting in your best interests, a court may appoint a guardian to act on your behalf.  Living will  A living will is a set of instructions that state your wishes about medical care when you cannot express them yourself. Health care providers should keep a copy of your living will in your medical record. You may want to give a copy to family members or friends. To alert caregivers in case of an emergency, you can place a card in your wallet to let them know that you have a living will and where they can find it. A living will is used if you become:  · Terminally ill.  · Disabled.  · Unable to communicate or make decisions.  Items to consider in your living will include:  · To use or not to use life-support equipment, such as dialysis machines and breathing machines (ventilators).  · A DNR or DNAR order. This tells health care providers not to use cardiopulmonary resuscitation (CPR) if breathing or heartbeat stops.  · To use or not to use tube feeding.  · To be given or not to be given food and fluids.  · Comfort (palliative) care when the goal becomes comfort rather than a cure.  · Donation of organs and tissues.  A living will does not give instructions for distributing your money and property if you should pass away.  DNR or DNAR  A DNR or DNAR order is a request not to have CPR in the event that your heart stops beating or you stop breathing. If a DNR or DNAR order has not been made and shared, a health care provider will try to help any patient whose heart has stopped or who has stopped breathing. If you plan to have surgery, talk with your health care provider about how your DNR or DNAR order will be followed if problems occur.  What if I do not have an advance directive?  If you do not have an advance directive, some states assign family decision makers to act on your  behalf based on how closely you are related to them. Each state has its own laws about advance directives. You may want to check with your health care provider, , or state representative about the laws in your state.  Summary  · Advance directives are the legal documents that allow you to make choices ahead of time about your health care and medical treatment in case you become unable to tell others about your care.  · The process of discussing and writing advance directives should happen over time. You can change the advance directives, even after you have signed them.  · Advance directives include DNR or DNAR orders, living dennison, and designating an agent as your medical power of .  This information is not intended to replace advice given to you by your health care provider. Make sure you discuss any questions you have with your health care provider.  Document Revised: 01/28/2021 Document Reviewed: 07/16/2020  Elsevier Patient Education © 2021 [employer]

## 2021-07-06 NOTE — PROGRESS NOTES
The ABCs of the Annual Wellness Visit  Subsequent Medicare Wellness Visit    Chief Complaint   Patient presents with   • Medicare Wellness-subsequent   • Diabetes   • Bladder Prolapse     wants it checked.    • Peripheral Neuropathy       Subjective   History of Present Illness:  Raul Howell is a 80 y.o. female who presents for a Subsequent Medicare Wellness Visit.  Also needs follow up on bp, dm and bladder prolapse  BS running up to 150  Had covid vaccines    HEALTH RISK ASSESSMENT    Recent Hospitalizations:  No hospitalization(s) within the last year.    Current Medical Providers:  Patient Care Team:  Shavonne Rush MD as PCP - General  Shavonne Rush MD as PCP - Family Medicine    Smoking Status:  Social History     Tobacco Use   Smoking Status Never Smoker   Smokeless Tobacco Never Used       Alcohol Consumption:  Social History     Substance and Sexual Activity   Alcohol Use No       Depression Screen:   PHQ-2/PHQ-9 Depression Screening 7/6/2021   Little interest or pleasure in doing things 0   Feeling down, depressed, or hopeless 0   Total Score 0       Fall Risk Screen:  STEADI Fall Risk Assessment has not been completed.    Health Habits and Functional and Cognitive Screening:  Functional & Cognitive Status 7/6/2021   Do you have difficulty preparing food and eating? No   Do you have difficulty bathing yourself, getting dressed or grooming yourself? No   Do you have difficulty using the toilet? No   Do you have difficulty moving around from place to place? No   Do you have trouble with steps or getting out of a bed or a chair? No   Current Diet Well Balanced Diet   Dental Exam Up to date   Eye Exam Up to date   Exercise (times per week) 2 times per week   Current Exercises Include Walking   Current Exercise Activities Include -   Do you need help using the phone?  No   Are you deaf or do you have serious difficulty hearing?  No   Do you need help with transportation? No   Do you  need help shopping? No   Do you need help preparing meals?  No   Do you need help with housework?  No   Do you need help with laundry? No   Do you need help taking your medications? No   Do you need help managing money? No   Do you ever drive or ride in a car without wearing a seat belt? No   Have you felt unusual stress, anger or loneliness in the last month? No   Who do you live with? Spouse   If you need help, do you have trouble finding someone available to you? No   Have you been bothered in the last four weeks by sexual problems? No   Do you have difficulty concentrating, remembering or making decisions? -         Does the patient have evidence of cognitive impairment? No   MMSE done 30/30    Asprin use counseling:not taking aspirin    Age-appropriate Screening Schedule:  Refer to the list below for future screening recommendations based on patient's age, sex and/or medical conditions. Orders for these recommended tests are listed in the plan section. The patient has been provided with a written plan.    Health Maintenance   Topic Date Due   • DXA SCAN  Never done   • ZOSTER VACCINE (1 of 2) Never done   • URINE MICROALBUMIN  07/02/2021   • HEMOGLOBIN A1C  07/05/2021   • INFLUENZA VACCINE  08/01/2021   • DIABETIC EYE EXAM  03/02/2022   • TDAP/TD VACCINES (2 - Tdap) 10/28/2029          The following portions of the patient's history were reviewed and updated as appropriate: allergies, current medications, past family history, past medical history, past social history, past surgical history and problem list.    Outpatient Medications Prior to Visit   Medication Sig Dispense Refill   • Alcohol Swabs (B-D SINGLE USE SWABS REGULAR) pads 1 swab Daily. 100 each 1   • amLODIPine (NORVASC) 5 MG tablet TAKE 1 TABLET DAILY FOR BLOOD PRESSURE 90 tablet 1   • Blood Glucose Calibration (TRUE METRIX LEVEL 2) Normal solution 1 dose by In Vitro route Every 30 (Thirty) Days. 1 each 1   • Blood Glucose Monitoring Suppl (TRUE  "METRIX AIR GLUCOSE METER) w/Device kit 1 strip Daily. 1 kit 0   • glimepiride (AMARYL) 4 MG tablet TAKE 1 TABLET TWICE A  tablet 1   • glucose blood (TRUE METRIX BLOOD GLUCOSE TEST) test strip Test bs once a day (true metrix) 100 each 1   • TRUEPLUS LANCETS 30G misc Test BS once a day 100 each 1   • sulfamethoxazole-trimethoprim (Bactrim DS) 800-160 MG per tablet Take 1 tablet by mouth 2 (Two) Times a Day. 14 tablet 0     No facility-administered medications prior to visit.       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 Patient does not have an advance directive, information provided.    Review of Systems    Compared to one year ago, the patient feels her physical health is the same.  Compared to one year ago, the patient feels her mental health is the same.    Reviewed chart for potential of high risk medication in the elderly: yes  Reviewed chart for potential of harmful drug interactions in the elderly:yes    Objective         Vitals:    07/06/21 0852   BP: 132/78   BP Location: Left arm   Patient Position: Sitting   Cuff Size: Adult   Pulse: 71   Temp: 97.5 °F (36.4 °C)   TempSrc: Temporal   SpO2: 97%   Weight: 57.2 kg (126 lb)   Height: 157.5 cm (62\")       Body mass index is 23.05 kg/m².  Discussed the patient's BMI with her. The BMI " is in the acceptable range.    Physical Exam          Assessment/Plan   Medicare Risks and Personalized Health Plan  CMS Preventative Services Quick Reference  Immunizations Discussed/Encouraged (specific immunizations; Shingrix )    The above risks/problems have been discussed with the patient.  Pertinent information has been shared with the patient in the After Visit Summary.  Follow up plans and orders are seen below in the Assessment/Plan Section.    There are no diagnoses linked to this encounter.  Follow Up:  No follow-ups on file.     An After Visit Summary and PPPS were given to the patient.     Due mammogram and dexa

## 2021-07-07 NOTE — PROGRESS NOTES
The ABCs of the Annual Wellness Visit  Subsequent Medicare Wellness Visit    Chief Complaint   Patient presents with   • Medicare Wellness-subsequent   • Diabetes   • Bladder Prolapse     wants it checked.    • Peripheral Neuropathy       Subjective   History of Present Illness:  Raul Howell is a 80 y.o. female who presents for a Subsequent Medicare Wellness Visit.  Also needs follow up on bp, dm and bladder prolapse  BS running up to 150  Had covid vaccines  occ dysuria  Wants to see if her bladder has dropped more      HEALTH RISK ASSESSMENT    Recent Hospitalizations:  No hospitalization(s) within the last year.    Current Medical Providers:  Patient Care Team:  Shavonne Rush MD as PCP - General  Shavonne Rush MD as PCP - Family Medicine    Smoking Status:  Social History     Tobacco Use   Smoking Status Never Smoker   Smokeless Tobacco Never Used       Alcohol Consumption:  Social History     Substance and Sexual Activity   Alcohol Use No       Depression Screen:   PHQ-2/PHQ-9 Depression Screening 7/6/2021   Little interest or pleasure in doing things 0   Feeling down, depressed, or hopeless 0   Total Score 0       Fall Risk Screen:  STEADI Fall Risk Assessment has not been completed.    Health Habits and Functional and Cognitive Screening:  Functional & Cognitive Status 7/6/2021   Do you have difficulty preparing food and eating? No   Do you have difficulty bathing yourself, getting dressed or grooming yourself? No   Do you have difficulty using the toilet? No   Do you have difficulty moving around from place to place? No   Do you have trouble with steps or getting out of a bed or a chair? No   Current Diet Well Balanced Diet   Dental Exam Up to date   Eye Exam Up to date   Exercise (times per week) 2 times per week   Current Exercises Include Walking   Current Exercise Activities Include -   Do you need help using the phone?  No   Are you deaf or do you have serious difficulty  hearing?  No   Do you need help with transportation? No   Do you need help shopping? No   Do you need help preparing meals?  No   Do you need help with housework?  No   Do you need help with laundry? No   Do you need help taking your medications? No   Do you need help managing money? No   Do you ever drive or ride in a car without wearing a seat belt? No   Have you felt unusual stress, anger or loneliness in the last month? No   Who do you live with? Spouse   If you need help, do you have trouble finding someone available to you? No   Have you been bothered in the last four weeks by sexual problems? No   Do you have difficulty concentrating, remembering or making decisions? -         Does the patient have evidence of cognitive impairment? No   MMSE done 30/30    Asprin use counseling:not taking aspirin    Age-appropriate Screening Schedule:  Refer to the list below for future screening recommendations based on patient's age, sex and/or medical conditions. Orders for these recommended tests are listed in the plan section. The patient has been provided with a written plan.    Health Maintenance   Topic Date Due   • DXA SCAN  Never done   • ZOSTER VACCINE (1 of 2) Never done   • INFLUENZA VACCINE  08/01/2021   • HEMOGLOBIN A1C  01/06/2022   • DIABETIC EYE EXAM  03/02/2022   • URINE MICROALBUMIN  07/06/2022   • TDAP/TD VACCINES (2 - Tdap) 10/28/2029          The following portions of the patient's history were reviewed and updated as appropriate: allergies, current medications, past family history, past medical history, past social history, past surgical history and problem list.    Outpatient Medications Prior to Visit   Medication Sig Dispense Refill   • Alcohol Swabs (B-D SINGLE USE SWABS REGULAR) pads 1 swab Daily. 100 each 1   • amLODIPine (NORVASC) 5 MG tablet TAKE 1 TABLET DAILY FOR BLOOD PRESSURE 90 tablet 1   • Blood Glucose Calibration (TRUE METRIX LEVEL 2) Normal solution 1 dose by In Vitro route Every 30  (Thirty) Days. 1 each 1   • Blood Glucose Monitoring Suppl (TRUE METRIX AIR GLUCOSE METER) w/Device kit 1 strip Daily. 1 kit 0   • glimepiride (AMARYL) 4 MG tablet TAKE 1 TABLET TWICE A  tablet 1   • glucose blood (TRUE METRIX BLOOD GLUCOSE TEST) test strip Test bs once a day (true metrix) 100 each 1   • TRUEPLUS LANCETS 30G misc Test BS once a day 100 each 1   • sulfamethoxazole-trimethoprim (Bactrim DS) 800-160 MG per tablet Take 1 tablet by mouth 2 (Two) Times a Day. 14 tablet 0     No facility-administered medications prior to visit.       Patient Active Problem List   Diagnosis   • Arm pain, right   • Arthritis of elbow   • Difficult or painful urination   • Disorder associated with type 2 diabetes mellitus (CMS/HCC)   • Female bladder prolapse   • History of malignant melanoma of skin   • Essential hypertension   • Microalbuminuria   • Need for other prophylactic vaccination and inoculation against single diseases   • Osteoarthritis   • Other screening mammogram   • Tinea corporis   • Preventative health care   • Type 2 diabetes mellitus, without long-term current use of insulin (CMS/HCC)       Advanced Care Planning:  ACP discussion was held with the patient during this visit. Patient does not have an advance directive, information provided.    Review of Systems   Constitutional: Negative.    Respiratory: Negative.    Cardiovascular: Negative.    Gastrointestinal: Negative for abdominal pain, nausea and vomiting.   Genitourinary: Positive for dysuria. Negative for vaginal bleeding.   Neurological: Positive for numbness. Negative for dizziness, syncope, light-headedness and headaches.   Psychiatric/Behavioral: Negative.        Compared to one year ago, the patient feels her physical health is the same.  Compared to one year ago, the patient feels her mental health is the same.    Reviewed chart for potential of high risk medication in the elderly: yes  Reviewed chart for potential of harmful drug  "interactions in the elderly:yes    Objective         Vitals:    07/06/21 0852   BP: 132/78   BP Location: Left arm   Patient Position: Sitting   Cuff Size: Adult   Pulse: 71   Temp: 97.5 °F (36.4 °C)   TempSrc: Temporal   SpO2: 97%   Weight: 57.2 kg (126 lb)   Height: 157.5 cm (62\")   PainSc: 0-No pain       Body mass index is 23.05 kg/m².  Discussed the patient's BMI with her. The BMI is in the acceptable range.    Physical Exam  Vitals and nursing note reviewed. Exam conducted with a chaperone present.   Constitutional:       General: She is not in acute distress.     Appearance: Normal appearance. She is well-developed, well-groomed and normal weight.   HENT:      Head: Normocephalic and atraumatic.      Right Ear: Tympanic membrane and ear canal normal.      Left Ear: Tympanic membrane and ear canal normal.      Nose: Nose normal.      Mouth/Throat:      Mouth: Mucous membranes are moist.      Pharynx: Oropharynx is clear.   Neck:      Thyroid: No thyromegaly.   Cardiovascular:      Rate and Rhythm: Normal rate and regular rhythm.      Pulses:           Dorsalis pedis pulses are 1+ on the right side and 1+ on the left side.        Posterior tibial pulses are 1+ on the right side and 1+ on the left side.      Heart sounds: Normal heart sounds. No murmur heard.   No friction rub. No gallop.    Pulmonary:      Effort: Pulmonary effort is normal. No respiratory distress.      Breath sounds: Normal breath sounds. No wheezing or rales.   Abdominal:      General: Abdomen is flat. Bowel sounds are normal.      Palpations: Abdomen is soft.      Hernia: There is no hernia in the left inguinal area or right inguinal area.   Genitourinary:     Exam position: Lithotomy position.      Labia:         Right: No rash or tenderness.         Left: No rash or tenderness.       Comments: Cystocele present  Musculoskeletal:      Cervical back: Neck supple.      Right lower leg: No edema.      Left lower leg: No edema.      Right " foot: Normal range of motion. Bunion present. No deformity, Charcot foot, foot drop or prominent metatarsal heads.      Left foot: Normal range of motion. Bunion present. No deformity, Charcot foot, foot drop or prominent metatarsal heads.   Feet:      Right foot:      Skin integrity: Callus and dry skin present. No ulcer, blister, skin breakdown, erythema, warmth or fissure.      Toenail Condition: Right toenails are abnormally thick.      Left foot:      Skin integrity: Callus and dry skin present. No ulcer, blister, skin breakdown, erythema, warmth or fissure.      Toenail Condition: Left toenails are abnormally thick.   Lymphadenopathy:      Cervical: No cervical adenopathy.      Lower Body: No right inguinal adenopathy. No left inguinal adenopathy.   Skin:     General: Skin is warm and dry.   Neurological:      Mental Status: She is alert.   Psychiatric:         Mood and Affect: Mood is anxious.         Behavior: Behavior is cooperative.         Lab Results   Component Value Date    TRIG 115 07/06/2021    HDL 46 07/06/2021     (H) 07/06/2021    VLDL 21 07/06/2021    HGBA1C 7.7 (H) 07/06/2021        Assessment/Plan   Medicare Risks and Personalized Health Plan  CMS Preventative Services Quick Reference  Immunizations Discussed/Encouraged (specific immunizations; Shingrix )    The above risks/problems have been discussed with the patient.  Pertinent information has been shared with the patient in the After Visit Summary.  Follow up plans and orders are seen below in the Assessment/Plan Section.    Diagnoses and all orders for this visit:    1. Preventative health care (Primary)    2. Type 2 diabetes mellitus with diabetic polyneuropathy, without long-term current use of insulin (CMS/[facility])  -     Comprehensive Metabolic Panel  -     Lipid Panel; Future  -     Hemoglobin A1c  -     MicroAlbumin, Urine, Random - Urine, Clean Catch; Future    3. Essential hypertension  -     Comprehensive Metabolic Panel  -      Lipid Panel; Future    4. Breast cancer screening by mammogram  -     Mammo Screening Digital Tomosynthesis Bilateral With CAD; Future    5. Postmenopausal status  -     DEXA Bone Density Axial    6. Family history of breast cancer in first degree relative  -     Mammo Screening Digital Tomosynthesis Bilateral With CAD; Future    7. Dysuria  -     Urinalysis With Culture If Indicated -; Future      Follow Up:  Return in about 6 months (around 1/6/2022) for Recheck.     An After Visit Summary and PPPS were given to the patient.     Diabetic Foot Exam Performed    Due mammogram and dexa  Weight is WNL  Foot exam done  Will send her home with a urine cup to collect ua and microalbumin  Will see  Her back in 6 mo  She is UTD on covid 19 vaccines  Counseled on shingrix vaccine and she will d/w pharmacist     Labs ordered  Discussed advanced directives and gave a handout  Reassured her on her cystocele  She does not want to see a urologist at this time

## 2021-07-16 ENCOUNTER — TELEPHONE (OUTPATIENT)
Dept: FAMILY MEDICINE CLINIC | Facility: CLINIC | Age: 81
End: 2021-07-16

## 2021-07-19 ENCOUNTER — TELEPHONE (OUTPATIENT)
Dept: FAMILY MEDICINE CLINIC | Facility: CLINIC | Age: 81
End: 2021-07-19

## 2021-07-19 DIAGNOSIS — R05.9 COUGH: Primary | ICD-10-CM

## 2021-07-19 RX ORDER — BENZONATATE 100 MG/1
100 CAPSULE ORAL 3 TIMES DAILY PRN
Qty: 30 CAPSULE | Refills: 0 | Status: SHIPPED | OUTPATIENT
Start: 2021-07-19 | End: 2022-01-20

## 2021-07-19 NOTE — TELEPHONE ENCOUNTER
Caller: Raul Howell    Relationship: Self    Best call back number: [phone]     What medication are you requesting: SOMETHING FOR HEAD COLD.    What are your current symptoms: DRAINAGE, COUGH     How long have you been experiencing symptoms: LAST Thursday.    Have you had these symptoms before:    [x] Yes  [] No    Have you been treated for these symptoms before:   [x] Yes  [] No    If a prescription is needed, what is your preferred pharmacy and phone number[facility] [address] [facility] [place]  [address] [facility] [phone] [facility] [phone]      Additional notes:    SHE HAS BEEN TAKING ALLERGY MEDICATION BUT IT IS NOT HELPING AND NOW HAS DEVELOPED A COUGH AS WELL. MADE HER APPT WITH LISETTE TOMORROW BUT SHE DID ASK TO SEE IF DOCTOR WOULD SEND HER SOMETHING IN SO SHE DID NOT HAVE TO COME IN TO OFFICE.

## 2021-07-19 NOTE — TELEPHONE ENCOUNTER
She can try OTC mucinex.  I will also send in a cough medication if that doesn't help with her cough. If not improving or worsening, she will need to keep her appointment.

## 2021-07-26 ENCOUNTER — HOSPITAL ENCOUNTER (OUTPATIENT)
Dept: BONE DENSITY | Facility: HOSPITAL | Age: 81
Discharge: HOME | End: 2021-07-26
Admitting: FAMILY MEDICINE

## 2021-07-26 ENCOUNTER — HOSPITAL ENCOUNTER (OUTPATIENT)
Dept: MAMMOGRAPHY | Facility: HOSPITAL | Age: 81
Discharge: HOME | End: 2021-07-26
Admitting: FAMILY MEDICINE

## 2021-07-26 DIAGNOSIS — Z80.3 FAMILY HISTORY OF BREAST CANCER IN FIRST DEGREE RELATIVE: ICD-10-CM

## 2021-07-26 DIAGNOSIS — Z12.31 BREAST CANCER SCREENING BY MAMMOGRAM: ICD-10-CM

## 2021-07-26 PROCEDURE — 77067 SCR MAMMO BI INCL CAD: CPT

## 2021-07-26 PROCEDURE — 77063 BREAST TOMOSYNTHESIS BI: CPT

## 2021-07-26 PROCEDURE — 77080 DXA BONE DENSITY AXIAL: CPT

## 2021-07-30 ENCOUNTER — TELEPHONE (OUTPATIENT)
Dept: FAMILY MEDICINE CLINIC | Facility: CLINIC | Age: 81
End: 2021-07-30

## 2021-07-30 RX ORDER — ALENDRONATE SODIUM 70 MG/1
70 TABLET ORAL
Qty: 13 TABLET | Refills: 3 | Status: SHIPPED | OUTPATIENT
Start: 2021-07-30 | End: 2022-08-16 | Stop reason: SDUPTHER

## 2021-08-31 ENCOUNTER — TELEPHONE (OUTPATIENT)
Dept: FAMILY MEDICINE CLINIC | Facility: CLINIC | Age: 81
End: 2021-08-31

## 2021-08-31 NOTE — TELEPHONE ENCOUNTER
Caller: Raul Howell    Relationship: Self    Best call back number: [phone]    What medications are you currently taking:   Current Outpatient Medications on File Prior to Visit   Medication Sig Dispense Refill   • Alcohol Swabs (B-D SINGLE USE SWABS REGULAR) pads 1 swab Daily. 100 each 1   • alendronate (Fosamax) 70 MG tablet Take 1 tablet by mouth Every 7 (Seven) Days. 13 tablet 3   • amLODIPine (NORVASC) 5 MG tablet TAKE 1 TABLET DAILY FOR BLOOD PRESSURE 90 tablet 1   • benzonatate (Tessalon Perles) 100 MG capsule Take 1 capsule by mouth 3 (Three) Times a Day As Needed for Cough. 30 capsule 0   • Blood Glucose Calibration (TRUE METRIX LEVEL 2) Normal solution 1 dose by In Vitro route Every 30 (Thirty) Days. 1 each 1   • Blood Glucose Monitoring Suppl (TRUE METRIX AIR GLUCOSE METER) w/Device kit 1 strip Daily. 1 kit 0   • glimepiride (AMARYL) 4 MG tablet TAKE 1 TABLET TWICE A  tablet 1   • glucose blood (TRUE METRIX BLOOD GLUCOSE TEST) test strip Test bs once a day (true metrix) 100 each 1   • TRUEPLUS LANCETS 30G misc Test BS once a day 100 each 1     No current facility-administered medications on file prior to visit.          Which medication are you concerned about: alendronate (Fosamax) 70 MG tablet    Who prescribed you this medication: DR GUZMAN    What are your concerns: PATIENT WOULD LIKE TO DISCUSS THIS MEDICATION AND HOW TO TAKE IT. PATIENT HAS SOME QUESTIONS.

## 2021-10-22 ENCOUNTER — TELEPHONE (OUTPATIENT)
Dept: FAMILY MEDICINE CLINIC | Facility: CLINIC | Age: 81
End: 2021-10-22

## 2021-10-22 NOTE — TELEPHONE ENCOUNTER
I tried to call pt to schedule but she has no vm set up so I could not leave a message. I went ahead and put her in the last Friday spot and I will try to call her again on Monday to let her know.

## 2021-10-22 NOTE — TELEPHONE ENCOUNTER
Next appt I have is next Friday  If she is in severe pain, she needs to be seen earlier than that by someone else

## 2021-10-22 NOTE — TELEPHONE ENCOUNTER
Caller: Raul Howell    Relationship to patient: Self    Best call back number: [phone]    Chief complaint: PATIENT HAS A PAIN IN HER LEFT SIDE ON AND OFF AND WOULD LIKE TO SEE DR. MARIBEL GUZMAN.    Type of visit: OFFICE VISIT    Requested date: SOMETIME NEXT WEEK      Additional notes:PATIENT STATES THAT SHE CAN NEVER SEE DR. GUZMAN.

## 2021-11-15 RX ORDER — GLIMEPIRIDE 4 MG/1
TABLET ORAL
Qty: 180 TABLET | Refills: 1 | Status: SHIPPED | OUTPATIENT
Start: 2021-11-15 | End: 2022-06-06

## 2021-11-29 ENCOUNTER — TELEPHONE (OUTPATIENT)
Dept: FAMILY MEDICINE CLINIC | Facility: CLINIC | Age: 81
End: 2021-11-29

## 2021-11-29 NOTE — TELEPHONE ENCOUNTER
Patient wants to know if she is supposed to continue alendronate sodium 60mg 1 po weekly. She has 3 left and wanted to know before she refilled it if it she supposed to continue or not.

## 2021-12-01 RX ORDER — AMLODIPINE BESYLATE 5 MG/1
TABLET ORAL
Qty: 90 TABLET | Refills: 2 | Status: SHIPPED | OUTPATIENT
Start: 2021-12-01 | End: 2022-09-15 | Stop reason: SDUPTHER

## 2022-01-03 ENCOUNTER — TELEPHONE (OUTPATIENT)
Dept: FAMILY MEDICINE CLINIC | Facility: CLINIC | Age: 82
End: 2022-01-03

## 2022-01-03 NOTE — TELEPHONE ENCOUNTER
Pt has appointment on Thursday, was with her granddaughter on New Years [place], and she tested positive for COVID today.  She has no symptoms.  Do you want her to still come to her appointment?  Please call pt at [phone].

## 2022-01-04 NOTE — TELEPHONE ENCOUNTER
Looks like it was a 6 mo follow up -I would recommend she reschedule  We should be able to shift her appt

## 2022-01-10 ENCOUNTER — TELEPHONE (OUTPATIENT)
Dept: FAMILY MEDICINE CLINIC | Facility: CLINIC | Age: 82
End: 2022-01-10

## 2022-01-10 NOTE — TELEPHONE ENCOUNTER
Yes she does  One: she needs to quarantine Two: if she develops fever or aches from the vaccine, it could be confusing and would also make her feel worse

## 2022-01-10 NOTE — TELEPHONE ENCOUNTER
Caller: Raul Howell    Relationship to patient: Self    Best call back number: [phone]    Patient is needing: PT WOULD LIKE TO KNOW IF SHE HAS TO WAIT TO RECEIVE HER FLU SHOT IF SHE IS CURRENTLY COVID 19 POSITIVE

## 2022-01-20 ENCOUNTER — LAB (OUTPATIENT)
Dept: FAMILY MEDICINE CLINIC | Facility: CLINIC | Age: 82
End: 2022-01-20

## 2022-01-20 ENCOUNTER — OFFICE VISIT (OUTPATIENT)
Dept: FAMILY MEDICINE CLINIC | Facility: CLINIC | Age: 82
End: 2022-01-20

## 2022-01-20 VITALS
HEART RATE: 73 BPM | DIASTOLIC BLOOD PRESSURE: 83 MMHG | WEIGHT: 128 LBS | TEMPERATURE: 98.1 F | HEIGHT: 62 IN | SYSTOLIC BLOOD PRESSURE: 142 MMHG | BODY MASS INDEX: 23.55 KG/M2 | OXYGEN SATURATION: 97 %

## 2022-01-20 DIAGNOSIS — E11.42 TYPE 2 DIABETES MELLITUS WITH DIABETIC POLYNEUROPATHY, WITHOUT LONG-TERM CURRENT USE OF INSULIN: ICD-10-CM

## 2022-01-20 DIAGNOSIS — I10 ESSENTIAL HYPERTENSION: Primary | ICD-10-CM

## 2022-01-20 DIAGNOSIS — I10 ESSENTIAL HYPERTENSION: ICD-10-CM

## 2022-01-20 PROBLEM — B35.4 TINEA CORPORIS: Status: RESOLVED | Noted: 2018-05-15 | Resolved: 2022-01-20

## 2022-01-20 PROBLEM — D31.31 NEVUS OF CHOROID OF RIGHT EYE: Status: ACTIVE | Noted: 2017-08-29

## 2022-01-20 PROBLEM — H04.123 DRY EYES: Status: ACTIVE | Noted: 2021-08-17

## 2022-01-20 PROBLEM — H26.493 BILATERAL POSTERIOR CAPSULAR OPACIFICATION: Status: ACTIVE | Noted: 2021-08-17

## 2022-01-20 PROBLEM — Z96.1 BILATERAL PSEUDOPHAKIA: Status: ACTIVE | Noted: 2021-08-17

## 2022-01-20 PROBLEM — E11.9 DIABETES MELLITUS TREATED WITH ORAL MEDICATION: Status: ACTIVE | Noted: 2021-03-02

## 2022-01-20 PROBLEM — M79.601 ARM PAIN, RIGHT: Status: RESOLVED | Noted: 2018-11-05 | Resolved: 2022-01-20

## 2022-01-20 PROBLEM — R30.0 DIFFICULT OR PAINFUL URINATION: Status: RESOLVED | Noted: 2017-04-11 | Resolved: 2022-01-20

## 2022-01-20 PROBLEM — Z79.84 DIABETES MELLITUS TREATED WITH ORAL MEDICATION: Status: ACTIVE | Noted: 2021-03-02

## 2022-01-20 PROBLEM — Z23 NEED FOR OTHER PROPHYLACTIC VACCINATION AND INOCULATION AGAINST SINGLE DISEASES: Status: RESOLVED | Noted: 2018-11-05 | Resolved: 2022-01-20

## 2022-01-20 LAB
ALBUMIN SERPL-MCNC: 4.6 G/DL (ref 3.5–5.2)
ALBUMIN/GLOB SERPL: 1.5 G/DL
ALP SERPL-CCNC: 98 U/L (ref 39–117)
ALT SERPL W P-5'-P-CCNC: 13 U/L (ref 1–33)
ANION GAP SERPL CALCULATED.3IONS-SCNC: 8.9 MMOL/L (ref 5–15)
AST SERPL-CCNC: 21 U/L (ref 1–32)
BILIRUB SERPL-MCNC: 0.5 MG/DL (ref 0–1.2)
BUN SERPL-MCNC: 11 MG/DL (ref 8–23)
BUN/CREAT SERPL: 13.1 (ref 7–25)
CALCIUM SPEC-SCNC: 9.9 MG/DL (ref 8.6–10.5)
CHLORIDE SERPL-SCNC: 104 MMOL/L (ref 98–107)
CHOLEST SERPL-MCNC: 195 MG/DL (ref 0–200)
CO2 SERPL-SCNC: 28.1 MMOL/L (ref 22–29)
CREAT SERPL-MCNC: 0.84 MG/DL (ref 0.57–1)
GFR SERPL CREATININE-BSD FRML MDRD: 65 ML/MIN/1.73
GLOBULIN UR ELPH-MCNC: 3.1 GM/DL
GLUCOSE SERPL-MCNC: 142 MG/DL (ref 65–99)
HBA1C MFR BLD: 7.9 % (ref 3.5–5.6)
HDLC SERPL-MCNC: 46 MG/DL (ref 40–60)
LDLC SERPL CALC-MCNC: 123 MG/DL (ref 0–100)
LDLC/HDLC SERPL: 2.6 {RATIO}
POTASSIUM SERPL-SCNC: 4.8 MMOL/L (ref 3.5–5.2)
PROT SERPL-MCNC: 7.7 G/DL (ref 6–8.5)
SODIUM SERPL-SCNC: 141 MMOL/L (ref 136–145)
TRIGL SERPL-MCNC: 146 MG/DL (ref 0–150)
VLDLC SERPL-MCNC: 26 MG/DL (ref 5–40)

## 2022-01-20 PROCEDURE — 83036 HEMOGLOBIN GLYCOSYLATED A1C: CPT | Performed by: FAMILY MEDICINE

## 2022-01-20 PROCEDURE — 80053 COMPREHEN METABOLIC PANEL: CPT | Performed by: FAMILY MEDICINE

## 2022-01-20 PROCEDURE — 99213 OFFICE O/P EST LOW 20 MIN: CPT | Performed by: FAMILY MEDICINE

## 2022-01-20 PROCEDURE — 36415 COLL VENOUS BLD VENIPUNCTURE: CPT | Performed by: FAMILY MEDICINE

## 2022-01-20 PROCEDURE — 80061 LIPID PANEL: CPT | Performed by: FAMILY MEDICINE

## 2022-01-20 RX ORDER — LIFITEGRAST 50 MG/ML
SOLUTION/ DROPS OPHTHALMIC EVERY 12 HOURS
COMMUNITY
Start: 2021-10-07

## 2022-02-23 RX ORDER — CALCIUM CITRATE/VITAMIN D3 200MG-6.25
TABLET ORAL
Qty: 300 EACH | Refills: 1 | Status: SHIPPED | OUTPATIENT
Start: 2022-02-23

## 2022-04-26 NOTE — PROGRESS NOTES
Subjective   Raul Howell is a 81 y.o. female.       HPI   Pt is here today with concern of left hip pain.  Symptoms for one week.  Has been using Tylenol otc.  Pain increased a couple of days ago.  Pain is sharp; affects her sleep.  No swelling or redness.  No injury.  Sitting or lying have been more painful.      The following portions of the patient's history were reviewed and updated as appropriate: allergies, current medications, past family history, past medical history, past social history, past surgical history and problem list.    Review of Systems   Constitutional: Negative for chills, fatigue and fever.   Respiratory: Negative for cough, chest tightness, shortness of breath and wheezing.    Cardiovascular: Negative for chest pain and palpitations.   Gastrointestinal: Negative for diarrhea, nausea and vomiting.   Genitourinary: Negative for dysuria, flank pain, frequency and urgency.   Musculoskeletal: Positive for arthralgias and myalgias.   Neurological: Negative for dizziness, weakness, numbness and headache.   Psychiatric/Behavioral: Negative for depressed mood. The patient is not nervous/anxious.        Objective   Physical Exam  Vitals reviewed.   Constitutional:       General: She is not in acute distress.     Appearance: Normal appearance.   Cardiovascular:      Rate and Rhythm: Normal rate and regular rhythm.      Pulses: Normal pulses.      Heart sounds: Normal heart sounds. No murmur heard.  Pulmonary:      Effort: Pulmonary effort is normal. No respiratory distress.      Breath sounds: Normal breath sounds. No wheezing.   Chest:      Chest wall: No tenderness.   Abdominal:      Tenderness: There is no right CVA tenderness or left CVA tenderness.   Musculoskeletal:      Left hip: Tenderness and bony tenderness present. No deformity or crepitus. Decreased range of motion. Normal strength.   Skin:     General: Skin is warm and dry.      Findings: No erythema or rash.   Neurological:      General:  No focal deficit present.      Mental Status: She is alert and oriented to person, place, and time.   Psychiatric:         Mood and Affect: Mood normal.           Assessment/Plan   Diagnoses and all orders for this visit:    1. Left hip pain (Primary)  Comments:  Xray ordered.   Cont. Tylenol or Aleve.   Heat/ice and rest.   Orders:  -     XR Hip With or Without Pelvis 2 - 3 View Left; Future

## 2022-04-27 ENCOUNTER — OFFICE VISIT (OUTPATIENT)
Dept: FAMILY MEDICINE CLINIC | Facility: CLINIC | Age: 82
End: 2022-04-27

## 2022-04-27 VITALS
OXYGEN SATURATION: 95 % | HEIGHT: 62 IN | SYSTOLIC BLOOD PRESSURE: 165 MMHG | BODY MASS INDEX: 22.82 KG/M2 | DIASTOLIC BLOOD PRESSURE: 82 MMHG | WEIGHT: 124 LBS | HEART RATE: 94 BPM

## 2022-04-27 DIAGNOSIS — M25.552 LEFT HIP PAIN: Primary | ICD-10-CM

## 2022-04-27 PROCEDURE — 99213 OFFICE O/P EST LOW 20 MIN: CPT | Performed by: NURSE PRACTITIONER

## 2022-04-28 ENCOUNTER — HOSPITAL ENCOUNTER (OUTPATIENT)
Dept: GENERAL RADIOLOGY | Facility: HOSPITAL | Age: 82
Discharge: HOME | End: 2022-04-28
Admitting: NURSE PRACTITIONER

## 2022-04-28 DIAGNOSIS — M25.552 LEFT HIP PAIN: ICD-10-CM

## 2022-04-28 DIAGNOSIS — M25.559 HIP PAIN: Primary | ICD-10-CM

## 2022-04-28 PROCEDURE — 73502 X-RAY EXAM HIP UNI 2-3 VIEWS: CPT

## 2022-06-06 RX ORDER — GLIMEPIRIDE 4 MG/1
TABLET ORAL
Qty: 180 TABLET | Refills: 2 | Status: SHIPPED | OUTPATIENT
Start: 2022-06-06 | End: 2023-03-23 | Stop reason: SDUPTHER

## 2022-08-16 ENCOUNTER — OFFICE VISIT (OUTPATIENT)
Dept: FAMILY MEDICINE CLINIC | Facility: CLINIC | Age: 82
End: 2022-08-16

## 2022-08-16 ENCOUNTER — LAB (OUTPATIENT)
Dept: FAMILY MEDICINE CLINIC | Facility: CLINIC | Age: 82
End: 2022-08-16

## 2022-08-16 VITALS
HEART RATE: 74 BPM | WEIGHT: 127 LBS | TEMPERATURE: 98.6 F | SYSTOLIC BLOOD PRESSURE: 130 MMHG | HEIGHT: 62 IN | BODY MASS INDEX: 23.37 KG/M2 | DIASTOLIC BLOOD PRESSURE: 77 MMHG | OXYGEN SATURATION: 97 %

## 2022-08-16 DIAGNOSIS — I10 ESSENTIAL HYPERTENSION: ICD-10-CM

## 2022-08-16 DIAGNOSIS — E11.42 TYPE 2 DIABETES MELLITUS WITH DIABETIC POLYNEUROPATHY, WITHOUT LONG-TERM CURRENT USE OF INSULIN: ICD-10-CM

## 2022-08-16 DIAGNOSIS — R53.83 FATIGUE, UNSPECIFIED TYPE: ICD-10-CM

## 2022-08-16 DIAGNOSIS — Z00.00 ENCOUNTER FOR ANNUAL WELLNESS EXAM IN MEDICARE PATIENT: Primary | ICD-10-CM

## 2022-08-16 LAB
ALBUMIN SERPL-MCNC: 4.4 G/DL (ref 3.5–5.2)
ALBUMIN/GLOB SERPL: 1.7 G/DL
ALP SERPL-CCNC: 93 U/L (ref 39–117)
ALT SERPL W P-5'-P-CCNC: 15 U/L (ref 1–33)
ANION GAP SERPL CALCULATED.3IONS-SCNC: 7.7 MMOL/L (ref 5–15)
AST SERPL-CCNC: 15 U/L (ref 1–32)
BASOPHILS # BLD AUTO: 0.07 10*3/MM3 (ref 0–0.2)
BASOPHILS NFR BLD AUTO: 1 % (ref 0–1.5)
BILIRUB SERPL-MCNC: 0.5 MG/DL (ref 0–1.2)
BUN SERPL-MCNC: 12 MG/DL (ref 8–23)
BUN/CREAT SERPL: 14.6 (ref 7–25)
CALCIUM SPEC-SCNC: 9.3 MG/DL (ref 8.6–10.5)
CHLORIDE SERPL-SCNC: 103 MMOL/L (ref 98–107)
CO2 SERPL-SCNC: 29.3 MMOL/L (ref 22–29)
CREAT SERPL-MCNC: 0.82 MG/DL (ref 0.57–1)
DEPRECATED RDW RBC AUTO: 36.6 FL (ref 37–54)
EGFRCR SERPLBLD CKD-EPI 2021: 72.9 ML/MIN/1.73
EOSINOPHIL # BLD AUTO: 0.11 10*3/MM3 (ref 0–0.4)
EOSINOPHIL NFR BLD AUTO: 1.6 % (ref 0.3–6.2)
ERYTHROCYTE [DISTWIDTH] IN BLOOD BY AUTOMATED COUNT: 12.4 % (ref 12.3–15.4)
GLOBULIN UR ELPH-MCNC: 2.6 GM/DL
GLUCOSE SERPL-MCNC: 147 MG/DL (ref 65–99)
HBA1C MFR BLD: 8.4 % (ref 3.5–5.6)
HCT VFR BLD AUTO: 38.5 % (ref 34–46.6)
HGB BLD-MCNC: 13.2 G/DL (ref 12–15.9)
IMM GRANULOCYTES # BLD AUTO: 0.03 10*3/MM3 (ref 0–0.05)
IMM GRANULOCYTES NFR BLD AUTO: 0.4 % (ref 0–0.5)
LYMPHOCYTES # BLD AUTO: 1.52 10*3/MM3 (ref 0.7–3.1)
LYMPHOCYTES NFR BLD AUTO: 21.5 % (ref 19.6–45.3)
MCH RBC QN AUTO: 28.8 PG (ref 26.6–33)
MCHC RBC AUTO-ENTMCNC: 34.3 G/DL (ref 31.5–35.7)
MCV RBC AUTO: 84.1 FL (ref 79–97)
MONOCYTES # BLD AUTO: 0.5 10*3/MM3 (ref 0.1–0.9)
MONOCYTES NFR BLD AUTO: 7.1 % (ref 5–12)
NEUTROPHILS NFR BLD AUTO: 4.83 10*3/MM3 (ref 1.7–7)
NEUTROPHILS NFR BLD AUTO: 68.4 % (ref 42.7–76)
NRBC BLD AUTO-RTO: 0 /100 WBC (ref 0–0.2)
PLATELET # BLD AUTO: 262 10*3/MM3 (ref 140–450)
PMV BLD AUTO: 9.7 FL (ref 6–12)
POTASSIUM SERPL-SCNC: 4.1 MMOL/L (ref 3.5–5.2)
PROT SERPL-MCNC: 7 G/DL (ref 6–8.5)
RBC # BLD AUTO: 4.58 10*6/MM3 (ref 3.77–5.28)
SODIUM SERPL-SCNC: 140 MMOL/L (ref 136–145)
TSH SERPL DL<=0.05 MIU/L-ACNC: 0.63 UIU/ML (ref 0.27–4.2)
WBC NRBC COR # BLD: 7.06 10*3/MM3 (ref 3.4–10.8)

## 2022-08-16 PROCEDURE — 36415 COLL VENOUS BLD VENIPUNCTURE: CPT | Performed by: FAMILY MEDICINE

## 2022-08-16 PROCEDURE — 80053 COMPREHEN METABOLIC PANEL: CPT | Performed by: FAMILY MEDICINE

## 2022-08-16 PROCEDURE — 96160 PT-FOCUSED HLTH RISK ASSMT: CPT | Performed by: FAMILY MEDICINE

## 2022-08-16 PROCEDURE — 84443 ASSAY THYROID STIM HORMONE: CPT | Performed by: FAMILY MEDICINE

## 2022-08-16 PROCEDURE — 85025 COMPLETE CBC W/AUTO DIFF WBC: CPT | Performed by: FAMILY MEDICINE

## 2022-08-16 PROCEDURE — 1159F MED LIST DOCD IN RCRD: CPT | Performed by: FAMILY MEDICINE

## 2022-08-16 PROCEDURE — 83036 HEMOGLOBIN GLYCOSYLATED A1C: CPT | Performed by: FAMILY MEDICINE

## 2022-08-16 PROCEDURE — G0439 PPPS, SUBSEQ VISIT: HCPCS | Performed by: FAMILY MEDICINE

## 2022-08-16 RX ORDER — ALENDRONATE SODIUM 70 MG/1
70 TABLET ORAL
Qty: 13 TABLET | Refills: 3 | Status: SHIPPED | OUTPATIENT
Start: 2022-08-16

## 2022-08-29 ENCOUNTER — TELEPHONE (OUTPATIENT)
Dept: FAMILY MEDICINE CLINIC | Facility: CLINIC | Age: 82
End: 2022-08-29

## 2022-08-29 NOTE — TELEPHONE ENCOUNTER
Pt picked up samples today, but isnt sure if she should take in the morning or at night or if it matters.  Please call pt at [phone].

## 2022-09-15 RX ORDER — AMLODIPINE BESYLATE 5 MG/1
5 TABLET ORAL DAILY
Qty: 90 TABLET | Refills: 2 | Status: SHIPPED | OUTPATIENT
Start: 2022-09-15

## 2022-09-15 NOTE — TELEPHONE ENCOUNTER
Caller: Raul Howell    Relationship: Self    Best call back number: [phone]    Requested Prescriptions:   Requested Prescriptions     Pending Prescriptions Disp Refills   • amLODIPine (NORVASC) 5 MG tablet 90 tablet 2     Sig: Take 1 tablet by mouth Daily. for blood pressure        Pharmacy where request should be sent: [facility] - [place] [phone] [place] [phone] FX       Does the patient have less than a 3 day supply:  [] Yes  [x] No    Yolande HARVEY Rep   09/15/22 11:19 EDT

## 2022-09-19 NOTE — TELEPHONE ENCOUNTER
Patient stopped by the office to drop off her bloodsugar readings. They are on your desk.   We do not take any samples or copay cards of Januvia. She was given samples of the 100mg and takes one daily. Send in rx?  She bs lowest is 102  Highest is 214  Average is 168

## 2022-09-20 NOTE — TELEPHONE ENCOUNTER
She is out, so she is requesting 1 week supply to [facility] and [facility] day supply to [facility] please.

## 2022-09-20 NOTE — TELEPHONE ENCOUNTER
I will send a prescription to her pharmacy and I have 1 pended.  I do not know which pharmacy to send it to- [facility] or [facility]?

## 2022-09-21 ENCOUNTER — TELEPHONE (OUTPATIENT)
Dept: FAMILY MEDICINE CLINIC | Facility: CLINIC | Age: 82
End: 2022-09-21

## 2022-09-21 NOTE — TELEPHONE ENCOUNTER
She will stay on the glimepride and the januvia  rx for januvia were sent to her local and 90 day pharmacies  She still needs to be compliant with her diet

## 2023-03-02 ENCOUNTER — OFFICE VISIT (OUTPATIENT)
Dept: FAMILY MEDICINE CLINIC | Facility: CLINIC | Age: 83
End: 2023-03-02
Payer: MEDICARE

## 2023-03-02 ENCOUNTER — LAB (OUTPATIENT)
Dept: FAMILY MEDICINE CLINIC | Facility: CLINIC | Age: 83
End: 2023-03-02
Payer: MEDICARE

## 2023-03-02 VITALS
DIASTOLIC BLOOD PRESSURE: 78 MMHG | OXYGEN SATURATION: 100 % | TEMPERATURE: 97.3 F | SYSTOLIC BLOOD PRESSURE: 150 MMHG | HEART RATE: 76 BPM | BODY MASS INDEX: 23.63 KG/M2 | HEIGHT: 62 IN | WEIGHT: 128.4 LBS

## 2023-03-02 DIAGNOSIS — R82.81 PYURIA: ICD-10-CM

## 2023-03-02 DIAGNOSIS — N81.10 FEMALE BLADDER PROLAPSE: ICD-10-CM

## 2023-03-02 DIAGNOSIS — E11.42 TYPE 2 DIABETES MELLITUS WITH DIABETIC POLYNEUROPATHY, WITHOUT LONG-TERM CURRENT USE OF INSULIN: ICD-10-CM

## 2023-03-02 DIAGNOSIS — I10 ESSENTIAL HYPERTENSION: Primary | ICD-10-CM

## 2023-03-02 DIAGNOSIS — R30.0 DYSURIA: ICD-10-CM

## 2023-03-02 LAB
ALBUMIN SERPL-MCNC: 4.8 G/DL (ref 3.5–5.2)
ALBUMIN UR-MCNC: <1.2 MG/DL
ALBUMIN/GLOB SERPL: 1.5 G/DL
ALP SERPL-CCNC: 94 U/L (ref 39–117)
ALT SERPL W P-5'-P-CCNC: 18 U/L (ref 1–33)
ANION GAP SERPL CALCULATED.3IONS-SCNC: 8.6 MMOL/L (ref 5–15)
AST SERPL-CCNC: 11 U/L (ref 1–32)
BILIRUB BLD-MCNC: NEGATIVE MG/DL
BILIRUB SERPL-MCNC: 0.3 MG/DL (ref 0–1.2)
BUN SERPL-MCNC: 14 MG/DL (ref 8–23)
BUN/CREAT SERPL: 15.9 (ref 7–25)
CALCIUM SPEC-SCNC: 10.4 MG/DL (ref 8.6–10.5)
CHLORIDE SERPL-SCNC: 104 MMOL/L (ref 98–107)
CLARITY, POC: CLEAR
CO2 SERPL-SCNC: 28.4 MMOL/L (ref 22–29)
COLOR UR: YELLOW
CREAT SERPL-MCNC: 0.88 MG/DL (ref 0.57–1)
EGFRCR SERPLBLD CKD-EPI 2021: 66.5 ML/MIN/1.73
EXPIRATION DATE: ABNORMAL
GLOBULIN UR ELPH-MCNC: 3.3 GM/DL
GLUCOSE SERPL-MCNC: 98 MG/DL (ref 65–99)
GLUCOSE UR STRIP-MCNC: ABNORMAL MG/DL
HBA1C MFR BLD: 7.8 % (ref 4.8–5.6)
KETONES UR QL: NEGATIVE
LEUKOCYTE EST, POC: ABNORMAL
Lab: ABNORMAL
NITRITE UR-MCNC: NEGATIVE MG/ML
PH UR: 5.5 [PH] (ref 5–8)
POTASSIUM SERPL-SCNC: 4.3 MMOL/L (ref 3.5–5.2)
PROT SERPL-MCNC: 8.1 G/DL (ref 6–8.5)
PROT UR STRIP-MCNC: NEGATIVE MG/DL
RBC # UR STRIP: ABNORMAL /UL
SODIUM SERPL-SCNC: 141 MMOL/L (ref 136–145)
SP GR UR: 1 (ref 1–1.03)
UROBILINOGEN UR QL: NORMAL

## 2023-03-02 PROCEDURE — 87086 URINE CULTURE/COLONY COUNT: CPT | Performed by: FAMILY MEDICINE

## 2023-03-02 PROCEDURE — 80053 COMPREHEN METABOLIC PANEL: CPT | Performed by: FAMILY MEDICINE

## 2023-03-02 PROCEDURE — 82043 UR ALBUMIN QUANTITATIVE: CPT | Performed by: FAMILY MEDICINE

## 2023-03-02 PROCEDURE — 81003 URINALYSIS AUTO W/O SCOPE: CPT | Performed by: FAMILY MEDICINE

## 2023-03-02 PROCEDURE — 99213 OFFICE O/P EST LOW 20 MIN: CPT | Performed by: FAMILY MEDICINE

## 2023-03-02 PROCEDURE — 36415 COLL VENOUS BLD VENIPUNCTURE: CPT | Performed by: FAMILY MEDICINE

## 2023-03-02 PROCEDURE — 83036 HEMOGLOBIN GLYCOSYLATED A1C: CPT | Performed by: FAMILY MEDICINE

## 2023-03-02 RX ORDER — SULFAMETHOXAZOLE AND TRIMETHOPRIM 800; 160 MG/1; MG/1
1 TABLET ORAL 2 TIMES DAILY
Qty: 14 TABLET | Refills: 0 | Status: SHIPPED | OUTPATIENT
Start: 2023-03-02

## 2023-03-02 NOTE — PROGRESS NOTES
"Subjective   Raul Walsh is a 82 y.o. female.     History of Present Illness       Ms. RAUL WALSH is an 82-year-old female who presents for a follow-up on her blood pressure and diabetes.    The patient denies checking her blood glucose levels at home. She denies any chest pain or trouble breathing. She denies any numbness or tingling.    She has arthritis in her hands. She adds her hands are starting to \"lock up\". She adds she tries to stretch every morning. She adds in the middle of the night, she has to \"push\" her hands.    She is experiencing occasional burning with urination.    She does not need any refills at this time.    The following portions of the patient's history were reviewed and updated as appropriate: allergies, current medications, past family history, past medical history, past social history, past surgical history, and problem list.  Past Medical History:   Diagnosis Date   • Arthritis      Past Surgical History:   Procedure Laterality Date   • BLADDER SURGERY      2x   • KNEE SURGERY      right total knee   • WISDOM TOOTH EXTRACTION       Family History   Problem Relation Age of Onset   • Breast cancer Daughter      Social History     Socioeconomic History   • Marital status:    Tobacco Use   • Smoking status: Never   • Smokeless tobacco: Never   Vaping Use   • Vaping Use: Never used   Substance and Sexual Activity   • Alcohol use: No   • Drug use: No   • Sexual activity: Yes         Current Outpatient Medications:   •  Alcohol Swabs (B-D SINGLE USE SWABS REGULAR) pads, 1 swab Daily., Disp: 100 each, Rfl: 1  •  alendronate (Fosamax) 70 MG tablet, Take 1 tablet by mouth Every 7 (Seven) Days., Disp: 13 tablet, Rfl: 3  •  amLODIPine (NORVASC) 5 MG tablet, Take 1 tablet by mouth Daily. for blood pressure, Disp: 90 tablet, Rfl: 2  •  Blood Glucose Calibration (TRUE METRIX LEVEL 2) Normal solution, 1 dose by In Vitro route Every 30 (Thirty) Days., Disp: 1 each, Rfl: 1  •  Blood Glucose " "Monitoring Suppl (TRUE METRIX AIR GLUCOSE METER) w/Device kit, 1 strip Daily., Disp: 1 kit, Rfl: 0  •  glimepiride (AMARYL) 4 MG tablet, TAKE 1 TABLET TWICE A DAY, Disp: 180 tablet, Rfl: 2  •  glucose blood (True Metrix Blood Glucose Test) test strip, Test bs once a day (true metrix) dx: e11.65, Disp: 300 each, Rfl: 1  •  Lifitegrast (Xiidra) 5 % ophthalmic solution, Apply  to eye(s) as directed by provider Every 12 (Twelve) Hours., Disp: , Rfl:   •  SITagliptin (Januvia) 100 MG tablet, Take 1 tablet by mouth Daily., Disp: 7 tablet, Rfl: 0  •  TRUEPLUS LANCETS 30G misc, Test BS once a day, Disp: 100 each, Rfl: 1  •  sulfamethoxazole-trimethoprim (Bactrim DS) 800-160 MG per tablet, Take 1 tablet by mouth 2 (Two) Times a Day., Disp: 14 tablet, Rfl: 0    Review of Systems   Genitourinary: Positive for dysuria.     /78 (BP Location: Right arm, Patient Position: Sitting, Cuff Size: Large Adult)   Pulse 76   Temp 97.3 °F (36.3 °C) (Temporal)   Ht 157.5 cm (62\")   Wt 58.2 kg (128 lb 6.4 oz)   SpO2 100%   BMI 23.48 kg/m²       Objective   Physical Exam  Vitals and nursing note reviewed.   Constitutional:       Appearance: Normal appearance. She is normal weight.   Cardiovascular:      Rate and Rhythm: Normal rate and regular rhythm.      Heart sounds: Normal heart sounds.   Pulmonary:      Effort: Pulmonary effort is normal.      Breath sounds: Normal breath sounds.   Abdominal:      General: Abdomen is flat. Bowel sounds are normal.      Palpations: Abdomen is soft.      Tenderness: There is no abdominal tenderness. There is no right CVA tenderness or left CVA tenderness.   Musculoskeletal:      Cervical back: Neck supple.      Right lower leg: No edema.      Left lower leg: No edema.   Neurological:      Mental Status: She is alert.       Brief Urine Lab Results  (Last result in the past 365 days)      Color   Clarity   Blood   Leuk Est   Nitrite   Protein   CREAT   Urine HCG        03/02/23 1341 Yellow   " Clear   Trace   Small (1+)   Negative   Negative                   Assessment & Plan   Problems Addressed this Visit        Cardiac and Vasculature    Essential hypertension - Primary    Relevant Orders    Comprehensive Metabolic Panel       Endocrine and Metabolic    Type 2 diabetes mellitus, without long-term current use of insulin (HCC)    Relevant Orders    Comprehensive Metabolic Panel    Hemoglobin A1c    MicroAlbumin, Urine, Random - Urine, Clean Catch       Genitourinary and Reproductive     Female bladder prolapse    Relevant Orders    Ambulatory Referral to Urology   Other Visit Diagnoses     Dysuria        Relevant Orders    POCT urinalysis dipstick, automated (Completed)    Urine Culture - Urine, Urine, Clean Catch    Pyuria        Relevant Orders    Urine Culture - Urine, Urine, Clean Catch      Diagnoses       Codes Comments    Essential hypertension    -  Primary ICD-10-CM: I10  ICD-9-CM: 401.9     Type 2 diabetes mellitus with diabetic polyneuropathy, without long-term current use of insulin (HCC)     ICD-10-CM: E11.42  ICD-9-CM: 250.60, 357.2     Female bladder prolapse     ICD-10-CM: N81.10  ICD-9-CM: 618.01     Dysuria     ICD-10-CM: R30.0  ICD-9-CM: 788.1     Pyuria     ICD-10-CM: R82.81  ICD-9-CM: 791.9             1. Hypertension  - She is doing fairly well. She will continue the amlodipine 5 mg.    2. Type 2 diabetes  - She will continue the glimepiride and Januvia. She is due labs for both.    3. Bladder prolapse  - I have gotten her referral to urology as the prolapse has worsened.    4. Dysuria and pyuria  - Urinalysis is abnormal and I have started her on Bactrim DS twice daily and have sent her urine for culture.    I will see her back in 6 months for follow-up Medicare wellness.                Transcribed from ambient dictation for Shavonne Rush MD by Radha Gonzalez.  03/02/23   14:22 EST    Patient or patient representative verbalized consent to the visit recording.  I  have personally performed the services described in this document as transcribed by the above individual, and it is both accurate and complete.

## 2023-03-02 NOTE — PROGRESS NOTES
Subjective   Raul Howell is a 82 y.o. female.     History of Present Illness     The following portions of the patient's history were reviewed and updated as appropriate: allergies, current medications, past family history, past medical history, past social history, past surgical history, and problem list.  Past Medical History:   Diagnosis Date   • Arthritis      Past Surgical History:   Procedure Laterality Date   • BLADDER SURGERY      2x   • KNEE SURGERY      right total knee   • WISDOM TOOTH EXTRACTION       Family History   Problem Relation Age of Onset   • Breast cancer Daughter      Social History     Socioeconomic History   • Marital status:    Tobacco Use   • Smoking status: Never   • Smokeless tobacco: Never   Vaping Use   • Vaping Use: Never used   Substance and Sexual Activity   • Alcohol use: No   • Drug use: No   • Sexual activity: Yes         Current Outpatient Medications:   •  Alcohol Swabs (B-D SINGLE USE SWABS REGULAR) pads, 1 swab Daily., Disp: 100 each, Rfl: 1  •  alendronate (Fosamax) 70 MG tablet, Take 1 tablet by mouth Every 7 (Seven) Days., Disp: 13 tablet, Rfl: 3  •  amLODIPine (NORVASC) 5 MG tablet, Take 1 tablet by mouth Daily. for blood pressure, Disp: 90 tablet, Rfl: 2  •  Blood Glucose Calibration (TRUE METRIX LEVEL 2) Normal solution, 1 dose by In Vitro route Every 30 (Thirty) Days., Disp: 1 each, Rfl: 1  •  Blood Glucose Monitoring Suppl (TRUE METRIX AIR GLUCOSE METER) w/Device kit, 1 strip Daily., Disp: 1 kit, Rfl: 0  •  glimepiride (AMARYL) 4 MG tablet, TAKE 1 TABLET TWICE A DAY, Disp: 180 tablet, Rfl: 2  •  glucose blood (True Metrix Blood Glucose Test) test strip, Test bs once a day (true metrix) dx: e11.65, Disp: 300 each, Rfl: 1  •  Lifitegrast (Xiidra) 5 % ophthalmic solution, Apply  to eye(s) as directed by provider Every 12 (Twelve) Hours., Disp: , Rfl:   •  SITagliptin (Januvia) 100 MG tablet, Take 1 tablet by mouth Daily., Disp: 7 tablet, Rfl: 0  •  TRUEPLUS  "LANCETS 30G misc, Test BS once a day, Disp: 100 each, Rfl: 1  •  sulfamethoxazole-trimethoprim (Bactrim DS) 800-160 MG per tablet, Take 1 tablet by mouth 2 (Two) Times a Day., Disp: 14 tablet, Rfl: 0    Review of Systems  /78 (BP Location: Right arm, Patient Position: Sitting, Cuff Size: Large Adult)   Pulse 76   Temp 97.3 °F (36.3 °C) (Temporal)   Ht 157.5 cm (62\")   Wt 58.2 kg (128 lb 6.4 oz)   SpO2 100%   BMI 23.48 kg/m²       Objective   Physical Exam  Vitals and nursing note reviewed.   Constitutional:       Appearance: Normal appearance. She is normal weight.   Cardiovascular:      Rate and Rhythm: Normal rate and regular rhythm.      Heart sounds: Normal heart sounds.   Pulmonary:      Effort: Pulmonary effort is normal.      Breath sounds: Normal breath sounds.   Abdominal:      General: Abdomen is flat. Bowel sounds are normal.      Palpations: Abdomen is soft.      Tenderness: There is no abdominal tenderness. There is no right CVA tenderness or left CVA tenderness.   Musculoskeletal:      Cervical back: Neck supple.      Right lower leg: No edema.      Left lower leg: No edema.   Neurological:      Mental Status: She is alert.       Brief Urine Lab Results     None            Assessment & Plan   Problems Addressed this Visit        Cardiac and Vasculature    Essential hypertension - Primary    Relevant Orders    Comprehensive Metabolic Panel       Endocrine and Metabolic    Type 2 diabetes mellitus, without long-term current use of insulin (HCC)    Relevant Orders    Comprehensive Metabolic Panel    Hemoglobin A1c    MicroAlbumin, Urine, Random - Urine, Clean Catch       Genitourinary and Reproductive     Female bladder prolapse    Relevant Orders    Ambulatory Referral to Urology   Other Visit Diagnoses     Dysuria        Relevant Orders    POCT urinalysis dipstick, automated    Urine Culture - Urine, Urine, Clean Catch    Pyuria        Relevant Orders    Urine Culture - Urine, Urine, Clean " Catch      Diagnoses       Codes Comments    Essential hypertension    -  Primary ICD-10-CM: I10  ICD-9-CM: 401.9     Type 2 diabetes mellitus with diabetic polyneuropathy, without long-term current use of insulin (HCC)     ICD-10-CM: E11.42  ICD-9-CM: 250.60, 357.2     Female bladder prolapse     ICD-10-CM: N81.10  ICD-9-CM: 618.01     Dysuria     ICD-10-CM: R30.0  ICD-9-CM: 788.1     Pyuria     ICD-10-CM: R82.81  ICD-9-CM: 791.9

## 2023-03-02 NOTE — PATIENT INSTRUCTIONS
If you decide that you would like to, you should be able to get the shingles vaccine at the pharmacy for free

## 2023-03-04 LAB — BACTERIA SPEC AEROBE CULT: NO GROWTH

## 2023-03-09 ENCOUNTER — TELEPHONE (OUTPATIENT)
Dept: FAMILY MEDICINE CLINIC | Facility: CLINIC | Age: 83
End: 2023-03-09
Payer: MEDICARE

## 2023-03-09 NOTE — TELEPHONE ENCOUNTER
Pt states she was in the office last Thursday and was given meds for UTI.  She says she was called on Monday and told to go off meds, but now back and kidney area are sore.  Is it ok to go back on those meds?  Please call pt.

## 2023-03-23 RX ORDER — GLIMEPIRIDE 4 MG/1
4 TABLET ORAL 2 TIMES DAILY
Qty: 180 TABLET | Refills: 1 | Status: SHIPPED | OUTPATIENT
Start: 2023-03-23 | End: 2023-03-31 | Stop reason: SDUPTHER

## 2023-03-23 NOTE — TELEPHONE ENCOUNTER
Caller: Raul Howell    Relationship: Self    Best call back number: [phone]    Requested Prescriptions:   Requested Prescriptions     Pending Prescriptions Disp Refills   • SITagliptin (Januvia) 100 MG tablet 90 tablet 1     Sig: Take 1 tablet by mouth Daily.   • glimepiride (AMARYL) 4 MG tablet 180 tablet 2     Sig: Take 1 tablet by mouth 2 (Two) Times a Day.        Pharmacy where request should be sent: [facility] ) - [facility] [address] [address] [phone] [facility] [phone]      Last office visit with prescribing clinician: 3/2/2023   Last telemedicine visit with prescribing clinician: 9/15/2023   Next office visit with prescribing clinician: 9/15/2023     Additional details provided by patient: PATIENT HAS 5 TABLETS LEFT    Does the patient have less than a 3 day supply:  [] Yes  [x] No    Would you like a call back once the refill request has been completed: [x] Yes [] No    If the office needs to give you a call back, can they leave a voicemail: [x] Yes [] No    Yolande Ngo Rep   03/23/23 09:18 EDT

## 2023-03-29 ENCOUNTER — TELEPHONE (OUTPATIENT)
Dept: FAMILY MEDICINE CLINIC | Facility: CLINIC | Age: 83
End: 2023-03-29
Payer: MEDICARE

## 2023-03-29 NOTE — TELEPHONE ENCOUNTER
Caller: Raul Howell    Relationship: Self    Best call back number: [phone]    What medications are you currently taking:   Current Outpatient Medications on File Prior to Visit   Medication Sig Dispense Refill   • Alcohol Swabs (B-D SINGLE USE SWABS REGULAR) pads 1 swab Daily. 100 each 1   • alendronate (Fosamax) 70 MG tablet Take 1 tablet by mouth Every 7 (Seven) Days. 13 tablet 3   • amLODIPine (NORVASC) 5 MG tablet Take 1 tablet by mouth Daily. for blood pressure 90 tablet 2   • Blood Glucose Calibration (TRUE METRIX LEVEL 2) Normal solution 1 dose by In Vitro route Every 30 (Thirty) Days. 1 each 1   • Blood Glucose Monitoring Suppl (TRUE METRIX AIR GLUCOSE METER) w/Device kit 1 strip Daily. 1 kit 0   • glimepiride (AMARYL) 4 MG tablet Take 1 tablet by mouth 2 (Two) Times a Day. 180 tablet 1   • glucose blood (True Metrix Blood Glucose Test) test strip Test bs once a day (true metrix) dx: e11.65 300 each 1   • Lifitegrast (Xiidra) 5 % ophthalmic solution Apply  to eye(s) as directed by provider Every 12 (Twelve) Hours.     • SITagliptin (Januvia) 100 MG tablet Take 1 tablet by mouth Daily. 90 tablet 1   • sulfamethoxazole-trimethoprim (Bactrim DS) 800-160 MG per tablet Take 1 tablet by mouth 2 (Two) Times a Day. 14 tablet 0   • TRUEPLUS LANCETS 30G misc Test BS once a day 100 each 1     No current facility-administered medications on file prior to visit.          When did you start taking these medications: PHARMACY IS CALLING TO ASK PATIENT WHY IS SHE ON GLIIMEPRIDE     Which medication are you concerned about: PATIENT DID NOT UNDERSTAND WHY THE INSURANCE COMPANY CALLED    Who prescribed you this medication:     What are your concerns:     How long have you had these concerns:  PATIENT NEEDS HER PROVIDER TO CALL HER BACK AS SOON AS POSSIBLE

## 2023-03-29 NOTE — TELEPHONE ENCOUNTER
Pt says that she knows why she is on the glimepride. Her question was that [facility] called her and told her that they wanted to verify dosing of her glimepride. I have spoken with [facility] and resolved this issue. Pt notified.

## 2023-03-31 RX ORDER — GLIMEPIRIDE 4 MG/1
4 TABLET ORAL 2 TIMES DAILY
Qty: 180 TABLET | Refills: 1 | Status: SHIPPED | OUTPATIENT
Start: 2023-03-31

## 2023-03-31 NOTE — TELEPHONE ENCOUNTER
Caller: Raul Howell    Relationship: Self    Best call back number: [phone]    Requested Prescriptions:   Requested Prescriptions     Pending Prescriptions Disp Refills   • glimepiride (AMARYL) 4 MG tablet 180 tablet 1     Sig: Take 1 tablet by mouth 2 (Two) Times a Day.        Pharmacy where request should be sent: [facility] - [place], [place] - [address] [facility] [phone] [facility] [phone] FX     Last office visit with prescribing clinician: 3/2/2023   Last telemedicine visit with prescribing clinician: 9/15/2023   Next office visit with prescribing clinician: 9/15/2023       Yolande Santana Rep   03/31/23 09:18 EDT

## 2023-05-28 RX ORDER — AMLODIPINE BESYLATE 5 MG/1
TABLET ORAL
Qty: 90 TABLET | Refills: 2 | Status: SHIPPED | OUTPATIENT
Start: 2023-05-28

## 2023-06-11 RX ORDER — SITAGLIPTIN 100 MG/1
TABLET, FILM COATED ORAL
Qty: 90 TABLET | Refills: 0 | Status: SHIPPED | OUTPATIENT
Start: 2023-06-11

## 2023-09-15 ENCOUNTER — OFFICE VISIT (OUTPATIENT)
Dept: FAMILY MEDICINE CLINIC | Facility: CLINIC | Age: 83
End: 2023-09-15
Payer: MEDICARE

## 2023-09-15 ENCOUNTER — LAB (OUTPATIENT)
Dept: FAMILY MEDICINE CLINIC | Facility: CLINIC | Age: 83
End: 2023-09-15
Payer: MEDICARE

## 2023-09-15 VITALS
HEART RATE: 65 BPM | BODY MASS INDEX: 23.3 KG/M2 | TEMPERATURE: 97.8 F | HEIGHT: 62 IN | SYSTOLIC BLOOD PRESSURE: 150 MMHG | DIASTOLIC BLOOD PRESSURE: 77 MMHG | WEIGHT: 126.6 LBS | OXYGEN SATURATION: 100 %

## 2023-09-15 DIAGNOSIS — I10 ESSENTIAL HYPERTENSION: ICD-10-CM

## 2023-09-15 DIAGNOSIS — E11.42 TYPE 2 DIABETES MELLITUS WITH DIABETIC POLYNEUROPATHY, WITHOUT LONG-TERM CURRENT USE OF INSULIN: ICD-10-CM

## 2023-09-15 DIAGNOSIS — M81.0 POSTMENOPAUSAL OSTEOPOROSIS: ICD-10-CM

## 2023-09-15 DIAGNOSIS — Z00.00 ENCOUNTER FOR ANNUAL WELLNESS EXAM IN MEDICARE PATIENT: Primary | ICD-10-CM

## 2023-09-15 RX ORDER — GLIMEPIRIDE 4 MG/1
TABLET ORAL
Qty: 180 TABLET | Refills: 3 | Status: SHIPPED | OUTPATIENT
Start: 2023-09-15

## 2023-09-15 NOTE — PROGRESS NOTES
The ABCs of the Annual Wellness Visit  Subsequent Medicare Wellness Visit    Subjective    Raul Howell is a 82 y.o. female who presents for a Subsequent Medicare Wellness Visit.    The following portions of the patient's history were reviewed and   updated as appropriate: allergies, current medications, past family history, past medical history, past social history, past surgical history, and problem list.    Compared to one year ago, the patient feels her physical   health is the same.    Compared to one year ago, the patient feels her mental   health is worse.    Recent Hospitalizations:  She was not admitted to the hospital during the last year.       Current Medical Providers:  Patient Care Team:  Shavonne Rush MD as PCP - General  Shavonne Rush MD as PCP - Family Medicine    Outpatient Medications Prior to Visit   Medication Sig Dispense Refill    Alcohol Swabs (B-D SINGLE USE SWABS REGULAR) pads 1 swab Daily. 100 each 1    alendronate (FOSAMAX) 70 MG tablet TAKE 1 TABLET EVERY 7 DAYS 12 tablet 2    amLODIPine (NORVASC) 5 MG tablet TAKE 1 TABLET DAILY FOR BLOOD  PRESSURE 90 tablet 2    Blood Glucose Calibration (TRUE METRIX LEVEL 2) Normal solution 1 dose by In Vitro route Every 30 (Thirty) Days. 1 each 1    Blood Glucose Monitoring Suppl (TRUE METRIX AIR GLUCOSE METER) w/Device kit 1 strip Daily. 1 kit 0    glimepiride (AMARYL) 4 MG tablet Take 1 tablet by mouth 2 (Two) Times a Day. 180 tablet 1    glucose blood (True Metrix Blood Glucose Test) test strip Test bs once a day (true metrix) dx: e11.65 300 each 1    Lifitegrast (Xiidra) 5 % ophthalmic solution Apply  to eye(s) as directed by provider Every 12 (Twelve) Hours.      SITagliptin (Januvia) 100 MG tablet TAKE 1 TABLET BY MOUTH DAILY 90 tablet 0    TRUEPLUS LANCETS 30G misc Test BS once a day 100 each 1    sulfamethoxazole-trimethoprim (Bactrim DS) 800-160 MG per tablet Take 1 tablet by mouth 2 (Two) Times a Day. (Patient not  "taking: Reported on 9/15/2023) 14 tablet 0     No facility-administered medications prior to visit.       No opioid medication identified on active medication list. I have reviewed chart for other potential  high risk medication/s and harmful drug interactions in the elderly.        Aspirin is not on active medication list.  Aspirin use is not indicated based on review of current medical condition/s. Risk of harm outweighs potential benefits.  .    Patient Active Problem List   Diagnosis    Arthritis of elbow    Female bladder prolapse    History of malignant melanoma of skin    Essential hypertension    Microalbuminuria    Osteoarthritis    Other screening mammogram    Encounter for annual wellness exam in Medicare patient    Type 2 diabetes mellitus, without long-term current use of insulin    Bilateral posterior capsular opacification    Bilateral pseudophakia    Diabetes mellitus treated with oral medication    Dry eyes    Nevus of choroid of right eye     Advance Care Planning   Advance Care Planning     Advance Directive is not on file.  ACP discussion was held with the patient during this visit. Patient has an advance directive (not in EMR), copy requested.     Objective    Vitals:    09/15/23 0940   Weight: 57.4 kg (126 lb 9.6 oz)   Height: 157.5 cm (62\")     Estimated body mass index is 23.16 kg/m² as calculated from the following:    Height as of this encounter: 157.5 cm (62\").    Weight as of this encounter: 57.4 kg (126 lb 9.6 oz).    BMI is within normal parameters. No other follow-up for BMI required.      Does the patient have evidence of cognitive impairment? No  MMSE done 30/30          HEALTH RISK ASSESSMENT    Smoking Status:  Social History     Tobacco Use   Smoking Status Never   Smokeless Tobacco Never     Alcohol Consumption:  Social History     Substance and Sexual Activity   Alcohol Use No     Fall Risk Screen:    STEADI Fall Risk Assessment was completed, and patient is at LOW risk for " falls.Assessment completed on:9/15/2023    Depression Screenin/15/2023     9:48 AM   PHQ-2/PHQ-9 Depression Screening   Little Interest or Pleasure in Doing Things 0-->not at all   Feeling Down, Depressed or Hopeless 0-->not at all   PHQ-9: Brief Depression Severity Measure Score 0       Health Habits and Functional and Cognitive Screenin/15/2023     9:46 AM   Functional & Cognitive Status   Do you have difficulty preparing food and eating? No   Do you have difficulty bathing yourself, getting dressed or grooming yourself? No   Do you have difficulty using the toilet? No   Do you have difficulty moving around from place to place? No   Do you have trouble with steps or getting out of a bed or a chair? Yes   Current Diet Well Balanced Diet   Dental Exam Up to date   Eye Exam Up to date   Exercise (times per week) 1 times per week   Current Exercises Include House Cleaning;Treadmill;Walking   Do you need help using the phone?  No   Are you deaf or do you have serious difficulty hearing?  No   Do you need help to go to places out of walking distance? No   Do you need help shopping? No   Do you need help preparing meals?  No   Do you need help with housework?  No   Do you need help with laundry? No   Do you need help taking your medications? No   Do you need help managing money? No   Do you ever drive or ride in a car without wearing a seat belt? No   Have you felt unusual stress, anger or loneliness in the last month? No   Who do you live with? Spouse   If you need help, do you have trouble finding someone available to you? No   Have you been bothered in the last four weeks by sexual problems? No   Do you have difficulty concentrating, remembering or making decisions? No       Age-appropriate Screening Schedule:  Refer to the list below for future screening recommendations based on patient's age, sex and/or medical conditions. Orders for these recommended tests are listed in the plan section. The  "patient has been provided with a written plan.    Health Maintenance   Topic Date Due    ZOSTER VACCINE (1 of 2) Never done    COVID-19 Vaccine (5 - Pfizer series) 06/16/2022    DXA SCAN  07/26/2023    ANNUAL WELLNESS VISIT  08/16/2023    HEMOGLOBIN A1C  09/02/2023    DIABETIC EYE EXAM  09/21/2023    INFLUENZA VACCINE  10/01/2023    URINE MICROALBUMIN  03/02/2024    TDAP/TD VACCINES (2 - Tdap) 10/28/2029    Pneumococcal Vaccine 65+  Completed              Encouraged to get a flu shot this Fall  DEXA ordered  Did not want a mammogram  Counseled on advanced directives    CMS Preventative Services Quick Reference  Risk Factors Identified During Encounter  Immunizations Discussed/Encouraged: Influenza  The above risks/problems have been discussed with the patient.  Pertinent information has been shared with the patient in the After Visit Summary.  An After Visit Summary and PPPS were made available to the patient.    Follow Up:   Next Medicare Wellness visit to be scheduled in 1 year.       Additional E&M Note during same encounter follows:  Patient has multiple medical problems which are significant and separately identifiable that require additional work above and beyond the Medicare Wellness Visit.      Chief Complaint  Medicare Wellness-subsequent    Subjective        HPI  Raul Howell is also being seen today for follow up on bp and diabetes  BS running high   Does not consistently check   is very ill    Review of Systems   Constitutional: Negative.    Respiratory: Negative.     Cardiovascular: Negative.    Neurological: Negative.      Objective   Vital Signs:  Ht 157.5 cm (62\")   Wt 57.4 kg (126 lb 9.6 oz)   BMI 23.16 kg/m²     Physical Exam  Vitals and nursing note reviewed.   Constitutional:       Appearance: Normal appearance. She is well-developed, well-groomed and normal weight.   Cardiovascular:      Rate and Rhythm: Normal rate and regular rhythm.      Heart sounds: Normal heart sounds. "   Pulmonary:      Effort: Pulmonary effort is normal.      Breath sounds: Normal breath sounds.   Musculoskeletal:      Right lower leg: No edema.      Left lower leg: No edema.   Neurological:      Mental Status: She is alert and oriented to person, place, and time.   Psychiatric:         Mood and Affect: Mood normal.         Behavior: Behavior is cooperative.                       Assessment and Plan   Diagnoses and all orders for this visit:    1. Encounter for annual wellness exam in Medicare patient (Primary)    2. Type 2 diabetes mellitus with diabetic polyneuropathy, without long-term current use of insulin    3. Essential hypertension      She will continue current meds for bp and dm  Labs ordered  I will see her back in 6 mo       Follow Up   No follow-ups on file.  Patient was given instructions and counseling regarding her condition or for health maintenance advice. Please see specific information pulled into the AVS if appropriate.

## 2023-09-19 ENCOUNTER — LAB (OUTPATIENT)
Dept: FAMILY MEDICINE CLINIC | Facility: CLINIC | Age: 83
End: 2023-09-19
Payer: MEDICARE

## 2023-09-19 LAB
ALBUMIN SERPL-MCNC: 4.4 G/DL (ref 3.5–5.2)
ALBUMIN/GLOB SERPL: 1.5 G/DL
ALP SERPL-CCNC: 95 U/L (ref 39–117)
ALT SERPL W P-5'-P-CCNC: 19 U/L (ref 1–33)
ANION GAP SERPL CALCULATED.3IONS-SCNC: 12.8 MMOL/L (ref 5–15)
AST SERPL-CCNC: 14 U/L (ref 1–32)
BILIRUB SERPL-MCNC: 0.3 MG/DL (ref 0–1.2)
BUN SERPL-MCNC: 14 MG/DL (ref 8–23)
BUN/CREAT SERPL: 17.1 (ref 7–25)
CALCIUM SPEC-SCNC: 9.5 MG/DL (ref 8.6–10.5)
CHLORIDE SERPL-SCNC: 103 MMOL/L (ref 98–107)
CO2 SERPL-SCNC: 25.2 MMOL/L (ref 22–29)
CREAT SERPL-MCNC: 0.82 MG/DL (ref 0.57–1)
EGFRCR SERPLBLD CKD-EPI 2021: 72.4 ML/MIN/1.73
GLOBULIN UR ELPH-MCNC: 3 GM/DL
GLUCOSE SERPL-MCNC: 197 MG/DL (ref 65–99)
HBA1C MFR BLD: 7.8 % (ref 4.8–5.6)
POTASSIUM SERPL-SCNC: 4.4 MMOL/L (ref 3.5–5.2)
PROT SERPL-MCNC: 7.4 G/DL (ref 6–8.5)
SODIUM SERPL-SCNC: 141 MMOL/L (ref 136–145)

## 2023-09-19 PROCEDURE — 80053 COMPREHEN METABOLIC PANEL: CPT | Performed by: FAMILY MEDICINE

## 2023-09-19 PROCEDURE — 36415 COLL VENOUS BLD VENIPUNCTURE: CPT

## 2023-09-19 PROCEDURE — 83036 HEMOGLOBIN GLYCOSYLATED A1C: CPT | Performed by: FAMILY MEDICINE

## 2023-10-18 RX ORDER — SITAGLIPTIN 100 MG/1
TABLET, FILM COATED ORAL
Qty: 90 TABLET | Refills: 3 | Status: SHIPPED | OUTPATIENT
Start: 2023-10-18

## 2023-12-08 RX ORDER — AMLODIPINE BESYLATE 5 MG/1
5 TABLET ORAL DAILY
Qty: 90 TABLET | Refills: 2 | Status: SHIPPED | OUTPATIENT
Start: 2023-12-08 | End: 2023-12-11 | Stop reason: SDUPTHER

## 2023-12-08 RX ORDER — GLIMEPIRIDE 4 MG/1
4 TABLET ORAL 2 TIMES DAILY
Qty: 180 TABLET | Refills: 3 | Status: SHIPPED | OUTPATIENT
Start: 2023-12-08 | End: 2023-12-11 | Stop reason: SDUPTHER

## 2023-12-11 RX ORDER — AMLODIPINE BESYLATE 5 MG/1
5 TABLET ORAL DAILY
Qty: 90 TABLET | Refills: 2 | Status: SHIPPED | OUTPATIENT
Start: 2023-12-11

## 2023-12-11 RX ORDER — GLIMEPIRIDE 4 MG/1
4 TABLET ORAL 2 TIMES DAILY
Qty: 180 TABLET | Refills: 3 | Status: SHIPPED | OUTPATIENT
Start: 2023-12-11

## 2024-03-30 RX ORDER — ALENDRONATE SODIUM 70 MG/1
TABLET ORAL
Qty: 12 TABLET | Refills: 0 | Status: SHIPPED | OUTPATIENT
Start: 2024-03-30

## 2024-04-03 ENCOUNTER — LAB (OUTPATIENT)
Dept: FAMILY MEDICINE CLINIC | Facility: CLINIC | Age: 84
End: 2024-04-03
Payer: MEDICARE

## 2024-04-03 ENCOUNTER — OFFICE VISIT (OUTPATIENT)
Dept: FAMILY MEDICINE CLINIC | Facility: CLINIC | Age: 84
End: 2024-04-03
Payer: MEDICARE

## 2024-04-03 VITALS
OXYGEN SATURATION: 99 % | DIASTOLIC BLOOD PRESSURE: 80 MMHG | HEIGHT: 62 IN | HEART RATE: 89 BPM | SYSTOLIC BLOOD PRESSURE: 124 MMHG | TEMPERATURE: 97.8 F | BODY MASS INDEX: 22.26 KG/M2 | WEIGHT: 121 LBS

## 2024-04-03 DIAGNOSIS — R80.9 MICROALBUMINURIA: ICD-10-CM

## 2024-04-03 DIAGNOSIS — M25.562 LEFT KNEE PAIN, UNSPECIFIED CHRONICITY: ICD-10-CM

## 2024-04-03 DIAGNOSIS — I10 ESSENTIAL HYPERTENSION: Primary | ICD-10-CM

## 2024-04-03 DIAGNOSIS — E11.42 TYPE 2 DIABETES MELLITUS WITH DIABETIC POLYNEUROPATHY, WITHOUT LONG-TERM CURRENT USE OF INSULIN: ICD-10-CM

## 2024-04-03 DIAGNOSIS — I10 ESSENTIAL HYPERTENSION: ICD-10-CM

## 2024-04-03 LAB
ANION GAP SERPL CALCULATED.3IONS-SCNC: 11.5 MMOL/L (ref 5–15)
BUN SERPL-MCNC: 11 MG/DL (ref 8–23)
BUN/CREAT SERPL: 13.1 (ref 7–25)
CALCIUM SPEC-SCNC: 9.3 MG/DL (ref 8.6–10.5)
CHLORIDE SERPL-SCNC: 101 MMOL/L (ref 98–107)
CO2 SERPL-SCNC: 27.5 MMOL/L (ref 22–29)
CREAT SERPL-MCNC: 0.84 MG/DL (ref 0.57–1)
EGFRCR SERPLBLD CKD-EPI 2021: 69.9 ML/MIN/1.73
GLUCOSE SERPL-MCNC: 217 MG/DL (ref 65–99)
HBA1C MFR BLD: 7.8 % (ref 4.8–5.6)
POTASSIUM SERPL-SCNC: 4.5 MMOL/L (ref 3.5–5.2)
SODIUM SERPL-SCNC: 140 MMOL/L (ref 136–145)

## 2024-04-03 PROCEDURE — 80048 BASIC METABOLIC PNL TOTAL CA: CPT | Performed by: FAMILY MEDICINE

## 2024-04-03 PROCEDURE — 3074F SYST BP LT 130 MM HG: CPT | Performed by: FAMILY MEDICINE

## 2024-04-03 PROCEDURE — 99214 OFFICE O/P EST MOD 30 MIN: CPT | Performed by: FAMILY MEDICINE

## 2024-04-03 PROCEDURE — 1159F MED LIST DOCD IN RCRD: CPT | Performed by: FAMILY MEDICINE

## 2024-04-03 PROCEDURE — 1160F RVW MEDS BY RX/DR IN RCRD: CPT | Performed by: FAMILY MEDICINE

## 2024-04-03 PROCEDURE — 83036 HEMOGLOBIN GLYCOSYLATED A1C: CPT | Performed by: FAMILY MEDICINE

## 2024-04-03 PROCEDURE — 3079F DIAST BP 80-89 MM HG: CPT | Performed by: FAMILY MEDICINE

## 2024-04-03 PROCEDURE — 36415 COLL VENOUS BLD VENIPUNCTURE: CPT

## 2024-04-03 NOTE — PROGRESS NOTES
Subjective   Raul Howell is a 83 y.o. female.     History of Present Illness  Here for follow up on bp and dm     The patient is an 83-year-old female who comes in today for follow-up on her blood pressure and diabetes and is also having problems with leg pain.    The patient acknowledges occasional non-compliance with her blood glucose levels.    The patient denies experiencing any chest pain or respiratory distress. She reports chronic pain in her left leg, which intensified on Saturday, 03/30/2024, rendering her unable to ambulate the following day, 03/31/2024. She sought medical attention at an immediate care facility the following day, where she was informed that the pain was not severe. The day prior, it was suspected that the pain was due to arthritis, given her extensive history of arthritis. The patient describes the pain as cramping, radiating up and down her leg. The intensity of the pain varies, sometimes presenting as cramping, sometimes sharp, and at other times aching. She attempted to manage the pain with Tylenol but has not taken any concurrent medications. A steroid was prescribed, but she has not commenced its use due to uncertainty about its use until her visit today. The pain, while not severe, is reminiscent of the initial pain, is primarily in her knee, extending up to her thigh. She denies any associated numbness or tingling. She also denies any history of injury. Occasionally, she experiences a sensation of coldness in her feet, which prevents her from warming, but upon touch, these symptoms have lessened. She has been under the care of Dr. Dowd for her knee, but has been unable to secure an appointment with Dr. Dowd, leading to a switch to Dr. Damon. She has a scheduled appointment with Dr. Dowd on 04/18/2024. Her last injection was administered 2 weeks ago.    Her father had arthritis.    The following portions of the patient's history were reviewed and updated as appropriate:  allergies, current medications, past family history, past medical history, past social history, past surgical history, and problem list.  Past Medical History:   Diagnosis Date    Arthritis      Past Surgical History:   Procedure Laterality Date    BLADDER SURGERY      2x    KNEE SURGERY      right total knee    SKIN CANCER EXCISION Left 2015    melanoma on lt arm    WISDOM TOOTH EXTRACTION       Family History   Problem Relation Age of Onset    Cancer Father     Lung cancer Father     Leukemia Father     Breast cancer Daughter      Social History     Socioeconomic History    Marital status:    Tobacco Use    Smoking status: Never     Passive exposure: Never    Smokeless tobacco: Never   Vaping Use    Vaping status: Never Used   Substance and Sexual Activity    Alcohol use: No    Drug use: No    Sexual activity: Yes         Current Outpatient Medications:     Alcohol Swabs (B-D SINGLE USE SWABS REGULAR) pads, 1 swab Daily., Disp: 100 each, Rfl: 1    alendronate (FOSAMAX) 70 MG tablet, TAKE 1 TABLET BY MOUTH WEEKLY  WITH 8 OZ OF PLAIN WATER 30  MINUTES BEFORE FIRST FOOD, DRINK OR MEDS. STAY UPRIGHT FOR 30  MINS, Disp: 12 tablet, Rfl: 0    amLODIPine (NORVASC) 5 MG tablet, Take 1 tablet by mouth Daily. for blood pressure, Disp: 90 tablet, Rfl: 2    Blood Glucose Calibration (TRUE METRIX LEVEL 2) Normal solution, 1 dose by In Vitro route Every 30 (Thirty) Days., Disp: 1 each, Rfl: 1    Blood Glucose Monitoring Suppl (TRUE METRIX AIR GLUCOSE METER) w/Device kit, 1 strip Daily., Disp: 1 kit, Rfl: 0    glimepiride (AMARYL) 4 MG tablet, Take 1 tablet by mouth 2 (Two) Times a Day., Disp: 180 tablet, Rfl: 3    glucose blood (True Metrix Blood Glucose Test) test strip, Test bs once a day (true metrix) dx: e11.65, Disp: 300 each, Rfl: 1    Lifitegrast (Xiidra) 5 % ophthalmic solution, Apply  to eye(s) as directed by provider Every 12 (Twelve) Hours., Disp: , Rfl:     Restasis 0.05 % ophthalmic emulsion, Apply 1 drop to  "eye(s) as directed by provider Every 12 (Twelve) Hours., Disp: , Rfl:     SITagliptin (Januvia) 100 MG tablet, Take 1 tablet by mouth Daily., Disp: 90 tablet, Rfl: 3    TRUEPLUS LANCETS 30G misc, Test BS once a day, Disp: 100 each, Rfl: 1    Review of Systems  /80   Pulse 89   Temp 97.8 °F (36.6 °C) (Temporal)   Ht 157.5 cm (62\")   Wt 54.9 kg (121 lb)   SpO2 99%   BMI 22.13 kg/m²   BMI is within normal parameters. No other follow-up for BMI required.   A review of systems was performed, and the pertinent positives are noted in the HPI.      Objective   Physical Exam  Vitals and nursing note reviewed.   Constitutional:       Appearance: Normal appearance. She is normal weight.   Cardiovascular:      Rate and Rhythm: Normal rate and regular rhythm.      Heart sounds: Normal heart sounds.   Pulmonary:      Effort: Pulmonary effort is normal.      Breath sounds: Normal breath sounds.   Musculoskeletal:      Right knee: Tenderness: inf/last.     Left knee: No swelling, effusion, erythema, ecchymosis or crepitus. Decreased range of motion. Tenderness (inf/lat) present.      Instability Tests: Anterior drawer test negative. Posterior drawer test negative.      Right lower leg: No edema.      Left lower leg: No edema.   Neurological:      Mental Status: She is alert.           Assessment & Plan   Problems Addressed this Visit          Cardiac and Vasculature    Essential hypertension - Primary    Relevant Orders    Basic Metabolic Panel       Endocrine and Metabolic    Type 2 diabetes mellitus, without long-term current use of insulin    Relevant Orders    Basic Metabolic Panel    MicroAlbumin, Urine, Random - Urine, Clean Catch    Hemoglobin A1c       Genitourinary and Reproductive     Microalbuminuria    Relevant Orders    MicroAlbumin, Urine, Random - Urine, Clean Catch     Other Visit Diagnoses       Left knee pain, unspecified chronicity              Diagnoses         Codes Comments    Essential " hypertension    -  Primary ICD-10-CM: I10  ICD-9-CM: 401.9     Type 2 diabetes mellitus with diabetic polyneuropathy, without long-term current use of insulin     ICD-10-CM: E11.42  ICD-9-CM: 250.60, 357.2     Microalbuminuria     ICD-10-CM: R80.9  ICD-9-CM: 791.0     Left knee pain, unspecified chronicity     ICD-10-CM: M25.562  ICD-9-CM: 719.46           Lab orders are already in the computer      1. Essential hypertension.  The patient is advised to persist with the administration of amlodipine. A Basic Metabolic Panel (BMP) is due.    2. Type 2 diabetes mellitus.  The patient is due for a BMP, microalbumin level, and an A1c test. The patient is to continue with the current regimen of glimepiride and Januvia.    3. Microalbumin.  A microalbumin level test is due.    4. Left knee pain.  The patient is scheduled to keep her appointment with the orthopedist. She has already been receiving steroid injections every 3 months. She has a steroid pack from the urgent care for use as needed.    Follow-up  The patient is scheduled for a follow-up visit in 6 months for her Medicare wellness check.         Transcribed from ambient dictation for Shavonne Rush MD by Juliet Shirley.  04/03/24   13:46 EDT    Patient or patient representative verbalized consent to the visit recording.  I have personally performed the services described in this document as transcribed by the above individual, and it is both accurate and complete.

## 2024-04-04 ENCOUNTER — TELEPHONE (OUTPATIENT)
Dept: FAMILY MEDICINE CLINIC | Facility: CLINIC | Age: 84
End: 2024-04-04
Payer: MEDICARE

## 2024-04-04 ENCOUNTER — LAB (OUTPATIENT)
Dept: FAMILY MEDICINE CLINIC | Facility: CLINIC | Age: 84
End: 2024-04-04
Payer: MEDICARE

## 2024-04-04 DIAGNOSIS — E11.42 TYPE 2 DIABETES MELLITUS WITH DIABETIC POLYNEUROPATHY, WITHOUT LONG-TERM CURRENT USE OF INSULIN: Primary | ICD-10-CM

## 2024-04-04 LAB — ALBUMIN UR-MCNC: 2.8 MG/DL

## 2024-04-04 PROCEDURE — 82043 UR ALBUMIN QUANTITATIVE: CPT | Performed by: FAMILY MEDICINE

## 2024-04-04 NOTE — TELEPHONE ENCOUNTER
The order is in the computer  She should be able to call and get it scheduled  She may need the number

## 2024-04-04 NOTE — TELEPHONE ENCOUNTER
Pt is just wondering about her RX for Alendronate.  Dr Jett prescribed last year, and told her get a DEXA  and see if the osteoarthritis has improved.  She hasn't been able to get the test done yet.  Does she need a new order?  Please call pt.

## 2024-04-15 ENCOUNTER — TELEPHONE (OUTPATIENT)
Dept: FAMILY MEDICINE CLINIC | Facility: CLINIC | Age: 84
End: 2024-04-15

## 2024-04-15 DIAGNOSIS — M81.0 OSTEOPOROSIS, UNSPECIFIED OSTEOPOROSIS TYPE, UNSPECIFIED PATHOLOGICAL FRACTURE PRESENCE: Primary | ICD-10-CM

## 2024-04-15 DIAGNOSIS — M81.0 AGE-RELATED OSTEOPOROSIS WITHOUT CURRENT PATHOLOGICAL FRACTURE: Primary | ICD-10-CM

## 2024-04-15 NOTE — TELEPHONE ENCOUNTER
A referral has been placed to the specialist to review current treatment for osteoporosis and discuss options.  She can continue current medication for now.

## 2024-04-15 NOTE — TELEPHONE ENCOUNTER
Caller: Raul Howell    Relationship: Self    Best call back number: [phone]    Caller requesting test results: PATIENT    What test was performed: DEXA SCAN    When was the test performed: 4/11/24    Where was the test performed: [facility]

## 2024-04-15 NOTE — TELEPHONE ENCOUNTER
Pt states that a nurse called her about her DEXA and wants her to see a specialist.  She is wanting to know if she should still take the alendronate?  Please call pt.

## 2024-04-16 RX ORDER — ALENDRONATE SODIUM 70 MG/1
70 TABLET ORAL
Qty: 12 TABLET | Refills: 0 | Status: SHIPPED | OUTPATIENT
Start: 2024-04-16

## 2024-06-04 ENCOUNTER — TELEPHONE (OUTPATIENT)
Dept: ENDOCRINOLOGY | Facility: CLINIC | Age: 84
End: 2024-06-04

## 2024-06-04 ENCOUNTER — OFFICE VISIT (OUTPATIENT)
Dept: ENDOCRINOLOGY | Facility: CLINIC | Age: 84
End: 2024-06-04
Payer: MEDICARE

## 2024-06-04 VITALS
OXYGEN SATURATION: 98 % | DIASTOLIC BLOOD PRESSURE: 72 MMHG | BODY MASS INDEX: 22.26 KG/M2 | SYSTOLIC BLOOD PRESSURE: 142 MMHG | HEIGHT: 62 IN | WEIGHT: 121 LBS | HEART RATE: 76 BPM

## 2024-06-04 DIAGNOSIS — E11.42 DIABETIC PERIPHERAL NEUROPATHY: ICD-10-CM

## 2024-06-04 DIAGNOSIS — E55.9 VITAMIN D DEFICIENCY: ICD-10-CM

## 2024-06-04 DIAGNOSIS — E11.649 DIABETIC HYPOGLYCEMIA: ICD-10-CM

## 2024-06-04 DIAGNOSIS — M81.0 AGE-RELATED OSTEOPOROSIS WITHOUT CURRENT PATHOLOGICAL FRACTURE: ICD-10-CM

## 2024-06-04 DIAGNOSIS — E11.65 TYPE 2 DIABETES MELLITUS WITH HYPERGLYCEMIA, WITHOUT LONG-TERM CURRENT USE OF INSULIN: Primary | ICD-10-CM

## 2024-06-04 LAB — GLUCOSE BLDC GLUCOMTR-MCNC: 138 MG/DL (ref 70–105)

## 2024-06-04 PROCEDURE — 3078F DIAST BP <80 MM HG: CPT | Performed by: INTERNAL MEDICINE

## 2024-06-04 PROCEDURE — 99204 OFFICE O/P NEW MOD 45 MIN: CPT | Performed by: INTERNAL MEDICINE

## 2024-06-04 PROCEDURE — 1160F RVW MEDS BY RX/DR IN RCRD: CPT | Performed by: INTERNAL MEDICINE

## 2024-06-04 PROCEDURE — 1159F MED LIST DOCD IN RCRD: CPT | Performed by: INTERNAL MEDICINE

## 2024-06-04 PROCEDURE — 3077F SYST BP >= 140 MM HG: CPT | Performed by: INTERNAL MEDICINE

## 2024-06-04 PROCEDURE — 82948 REAGENT STRIP/BLOOD GLUCOSE: CPT | Performed by: INTERNAL MEDICINE

## 2024-06-04 RX ORDER — SENNOSIDES 8.6 MG
650 CAPSULE ORAL EVERY 8 HOURS PRN
COMMUNITY

## 2024-06-04 RX ORDER — GLIMEPIRIDE 2 MG/1
2 TABLET ORAL 2 TIMES DAILY
Qty: 180 TABLET | Refills: 1 | Status: SHIPPED | OUTPATIENT
Start: 2024-06-04 | End: 2024-12-01

## 2024-06-04 RX ORDER — LANCETS 30 GAUGE
1 EACH MISCELLANEOUS
Qty: 200 EACH | Refills: 5 | Status: SHIPPED | OUTPATIENT
Start: 2024-06-04

## 2024-06-04 RX ORDER — BLOOD-GLUCOSE METER
1 KIT MISCELLANEOUS
Qty: 1 EACH | Refills: 0 | Status: SHIPPED | OUTPATIENT
Start: 2024-06-04

## 2024-06-04 RX ORDER — ALENDRONATE SODIUM 70 MG/1
70 TABLET ORAL
Qty: 12 TABLET | Refills: 1 | Status: SHIPPED | OUTPATIENT
Start: 2024-06-04

## 2024-06-04 NOTE — PATIENT INSTRUCTIONS
Please,    # Type 2 diabetes management  - Continue Januvia 100 mg 1 pill by mouth daily.  - Decrease glimepiride therapy to 2 mg twice a day.  - Start Jardiance 10 mg 1 pill by mouth daily.  - Check your blood sugar twice a day fasting and before dinnertime, please maintain the log of your blood sugars for me to review before next visit.    # Osteoporosis:  - Continue alendronate 70 mg 1 pill by mouth once a week  - Take it on empty stomach with at least 1-2 full glasses of water.  - Avoid taking it with any other flavored water, milk or any other drink including coffee.  - Swallow the whole tablet without chewing breaking or crushing.  - Do not lie down for at least 60 minutes after taking this drug or until you eat first food of the day.  - Start taking calcium supplement either calcium carbonate or calcium citrate 600 mg once a day with meal  - Also take at least 2 different types of daily sources every day  - Start taking vitamin D 5000 units every day with meal    Nonfasting blood work before next visit    Thank you for your visit today.    If you have any questions or concerns please feel free to reach out of the office.

## 2024-06-04 NOTE — PROGRESS NOTES
-----------------------------------------------------------------  ENDOCRINE CLINIC NOTE  -----------------------------------------------------------------        PATIENT NAME: Raul Howell  PATIENT : 1940 AGE: 83 y.o.  MRN NUMBER: [id number]  PRIMARY CARE: Shavonne Rush MD    ==========================================================================    CHIEF COMPLAINT: Type 2 diabetes with hyperglycemia and osteoporosis.  DATE OF SERVICE: 24    ==========================================================================    HPI / SUBJECTIVE    83 y.o. female is seen in the clinic today for type 2 diabetes with hyperglycemia and osteoporosis.    -Diagnosis Date:   -Last known A1c: 7.8% in 2024    -Current Therapy / Medications:  Januvia 100 mgs PO BID  Glimepiride 4 mgs twice a day    -Past tried medications: (Not currently using)  Metformin - cough  Trulicity - diarrhea    -Home BG logs / monitoring: Checking BG three to four times a week,     -Hypoglycemia: Noted to have hypoglycemia in 60's    -Meals / Dietary Habits: Two meals a day    -Last eye exam: [facility] following   -Neuropathy: Positive for neuropathy  -Nephropathy: No CKD  -Last lipid panel: Not on statin therapy  -No hx of UTI  -No CAD or CVA  -No personal or family history of pancreatitis or thyroid cancer    Osteoporosis Hx:    - Osteoporosis diagnosed in:   - Previous and current treatments: Fosamax since 2021     - Last DXA scan: 2024     - History of fractures: (Traumatic vs Nontraumatic) None  - Physical activity: Independent for ADLs and IADLs  - Exercise: Walks every day around 30 mins  - History of falls: None    - Menopause: around 47  - Any kids: 2 biological kids  - Use of hormone replacement therapy: None    - History of high calcium levels: None  - History of kidney stones: None  - Smoking history:None  - Alcohol intake: None  - History of chronic steroid use: None  -  History of rheumatoid arthritis: None  - Hx of thyroid problems: None    - Hx of radiation therapy if considering anabolic: None    - Family history of osteoporosis/fractures/parathyroid or calcium problems: None     - Loss of height: possibly 1/2 inch  - History of renal insufficiency: No CKD  - Active dental/jaw problems: None  - History of reflux (GERD) symptoms: None     - Calcium intake: None   * Milk: Every day   * Yogurt: None   * Cheese: None   * Ice-cream: 4 days a week    - Vitamin D supplementation or sun exposure: None    ==========================================================================                                                PAST MEDICAL HISTORY    Past Medical History:   Diagnosis Date    Arthritis     Cataract     Hypertension        ==========================================================================    PAST SURGICAL HISTORY    Past Surgical History:   Procedure Laterality Date    BACK SURGERY  1984    BLADDER SURGERY      2x    HYSTERECTOMY  1994    KNEE SURGERY      right total knee    SKIN CANCER EXCISION Left 2015    melanoma on lt arm    WISDOM TOOTH EXTRACTION         ==========================================================================    FAMILY HISTORY    Family History   Problem Relation Age of Onset    Cancer Father     Lung cancer Father     Leukemia Father     Breast cancer Daughter        ==========================================================================    SOCIAL HISTORY    Social History     Socioeconomic History    Marital status:    Tobacco Use    Smoking status: Never     Passive exposure: Never    Smokeless tobacco: Never   Vaping Use    Vaping status: Never Used   Substance and Sexual Activity    Alcohol use: No    Drug use: No    Sexual activity: Yes       ==========================================================================    MEDICATIONS      Current Outpatient Medications:     acetaminophen (TYLENOL) 650 MG 8 hr tablet, Take 1  tablet by mouth Every 8 (Eight) Hours As Needed for Mild Pain., Disp: , Rfl:     Alcohol Swabs (B-D SINGLE USE SWABS REGULAR) pads, 1 swab Daily., Disp: 100 each, Rfl: 1    alendronate (FOSAMAX) 70 MG tablet, Take 1 tablet by mouth Every 7 (Seven) Days., Disp: 12 tablet, Rfl: 1    amLODIPine (NORVASC) 5 MG tablet, Take 1 tablet by mouth Daily. for blood pressure, Disp: 90 tablet, Rfl: 2    Blood Glucose Calibration (TRUE METRIX LEVEL 2) Normal solution, 1 dose by In Vitro route Every 30 (Thirty) Days., Disp: 1 each, Rfl: 1    Blood Glucose Monitoring Suppl (TRUE METRIX AIR GLUCOSE METER) w/Device kit, 1 strip Daily., Disp: 1 kit, Rfl: 0    Cetirizine HCl (ZYRTEC PO), Take 1 tablet by mouth Daily., Disp: , Rfl:     glucose blood (True Metrix Blood Glucose Test) test strip, Test bs once a day (true metrix) dx: e11.65, Disp: 300 each, Rfl: 1    Restasis 0.05 % ophthalmic emulsion, Apply 1 drop to eye(s) as directed by provider Every 12 (Twelve) Hours., Disp: , Rfl:     SITagliptin (Januvia) 100 MG tablet, Take 1 tablet by mouth Daily., Disp: 90 tablet, Rfl: 3    TRUEPLUS LANCETS 30G misc, Test BS once a day, Disp: 100 each, Rfl: 1    empagliflozin (Jardiance) 10 MG tablet tablet, Take 1 tablet by mouth Daily., Disp: 90 tablet, Rfl: 1    glimepiride (Amaryl) 2 MG tablet, Take 1 tablet by mouth 2 (Two) Times a Day for 180 days., Disp: 180 tablet, Rfl: 1    glucose blood test strip, 1 each by Other route 4 (Four) Times a Day Before Meals & at Bedtime., Disp: 200 each, Rfl: 5    glucose monitor monitoring kit, Use 1 each 4 (Four) Times a Day Before Meals & at Bedtime., Disp: 1 each, Rfl: 0    Lancets misc, Use 1 each 4 (Four) Times a Day Before Meals & at Bedtime., Disp: 200 each, Rfl: 5    Lifitegrast (Xiidra) 5 % ophthalmic solution, Apply  to eye(s) as directed by provider Every 12 (Twelve) Hours. (Patient not taking: Reported on 6/4/2024), Disp: , Rfl:      ==========================================================================    ALLERGIES    Allergies   Allergen Reactions    Codeine GI Intolerance    Lisinopril Cough       ==========================================================================    OBJECTIVE    Vitals:    06/04/24 1033   BP: 142/72   Pulse: 76   SpO2: 98%     Body mass index is 22.13 kg/m².     General: Alert, cooperative, no acute distress  Thyroid:  no enlargement/tenderness/palpable nodules  Lungs: Clear to auscultation bilaterally, respirations unlabored  Heart: Regular rate and rhythm, S1 and S2 normal, no murmur, rub or gallop  Abdomen: Soft, NT, ND and Bowel sounds Positive    ==========================================================================    LAB EVALUATION    Lab Results   Component Value Date    GLUCOSE 217 (H) 04/03/2024    BUN 11 04/03/2024    CREATININE 0.84 04/03/2024    EGFRIFNONA 65 01/20/2022    BCR 13.1 04/03/2024    K 4.5 04/03/2024    CO2 27.5 04/03/2024    CALCIUM 9.3 04/03/2024    ALBUMIN 4.4 09/19/2023    LABIL2 1.3 03/22/2019    AST 14 09/19/2023    ALT 19 09/19/2023    CHOL 195 01/20/2022    TRIG 146 01/20/2022    HDL 46 01/20/2022     (H) 01/20/2022     Lab Results   Component Value Date    HGBA1C 7.80 (H) 04/03/2024    HGBA1C 7.80 (H) 09/19/2023    HGBA1C 7.80 (H) 03/02/2023     Lab Results   Component Value Date    MICROALBUR 2.8 04/04/2024    CREATININE 0.84 04/03/2024     Lab Results   Component Value Date    TSH 0.630 08/16/2022     ==========================================================================    DEXA scan:    4/11/2024 - [facility]  Left forarm 1/2:  T Score -2.4  Lumbar Spine T score -1.0  Left femoral neck T score -3.0  Left Total hip T score -2.7    [facility]/20[facility] - [facility]  Lumbar Spine T score -1.7  Left femoral neck -2.8  Left total hip T score -3.0      ==========================================================================    ASSESSMENT AND PLAN    # Type 2  diabetes with hyperglycemia  # Diabetic peripheral neuropathy  # Diabetic hypoglycemia  - Discussed with patient in detail about pathophysiology of type 2 diabetes and different therapeutic option  - Patient have evidence of hypoglycemia on fingerstick monitoring and therefore we will de-escalate Sulphrin therapy  - Patient is age of 83 therefore the target for A1c will be to achieve less than 8% and maintain less than 8% without hypoglycemia  - If possible we will try to achieve A1c less than 7.5% without any hypoglycemia as well  - Discussed with patient and will also introduce SGLT2 inhibitor therapy, benefit and side effect discussed with patient in detail to which she verbalized understanding  - New adjusted therapy:  Januvia 100 mg p.o. daily  Glimepiride decreased to 2 mg twice a day  Jardiance 10 mg p.o. daily  - Counseled patient about possibly using CGM therapy but she deferred  - Patient to check fingersticks twice a day fasting and before dinnertime and maintain logs and bring for me to review during next visit    # Osteoporosis, age-related without pathological fracture  - Patient is known to have osteoporosis in 2021  - Reviewed DEXA scan from 2021 and 2024  - Patient has been on Fosamax therapy since 2021 total of 3 years of exposure  - Even though DEXA scans are done at 2 different places cannot make a full comparison but there is evidence of improvement  - Patient have an appropriate calcium and vitamin D supplementation, start patient on calcium at target of 1200 mg every day with vitamin D 5000 units every day with meal  - Repeat DEXA scan in 2 years time and if needed in the future can also start patient on possibly Prolia therapy  - Repeat DEXA scan in 2026  - This was discussed with patient in detail to which verbalized understanding    Thank you for courtesy of consultation.    Return to clinic: 3 months    Entire assessment and plan was discussed and counseled the patient in detail to which  patient verbalized understanding and agreed with care.  Answered all queries and concerns.    Part of this note may be an electronic transcription/translation of spoken language to printed text using the Dragon Dictation System.     Note: Portions of this note may have been copied from previous notes but documentation have been reviewed and edited as necessary to support clinical decision making for today's visit.    ==========================================================================    INFORMATION PROVIDED TO PATIENT    Patient Instructions   Please,    # Type 2 diabetes management  - Continue Januvia 100 mg 1 pill by mouth daily.  - Decrease glimepiride therapy to 2 mg twice a day.  - Start Jardiance 10 mg 1 pill by mouth daily.  - Check your blood sugar twice a day fasting and before dinnertime, please maintain the log of your blood sugars for me to review before next visit.    # Osteoporosis:  - Continue alendronate 70 mg 1 pill by mouth once a week  - Take it on empty stomach with at least 1-2 full glasses of water.  - Avoid taking it with any other flavored water, milk or any other drink including coffee.  - Swallow the whole tablet without chewing breaking or crushing.  - Do not lie down for at least 60 minutes after taking this drug or until you eat first food of the day.  - Start taking calcium supplement either calcium carbonate or calcium citrate 600 mg once a day with meal  - Also take at least 2 different types of daily sources every day  - Start taking vitamin D 5000 units every day with meal    Nonfasting blood work before next visit    Thank you for your visit today.    If you have any questions or concerns please feel free to reach out of the office.       ==========================================================================  Richard Diane MD  Department of Endocrine, Diabetes and Metabolism  [facility]  [place]  ==========================================================================

## 2024-06-04 NOTE — TELEPHONE ENCOUNTER
Caller: Raul Howell    Relationship to patient: Self    Best call back number:     [phone]       Patient is needing: PT IS NEEDING A CALL BACK IN REGARDS TO HER MEDICATION. PT SEEN  AND  LOWERED HER DOSAGE FOR MEDICATION. PT IS WANTING TO KNOW IF SHE IS TO FINISH THE MEDICATION THAT SHE HAS UNTIL THE NEW DOSAGE OF MEDICATION COME IN . PLEASE ADVISE AND GARRETT BACK

## 2024-06-04 NOTE — TELEPHONE ENCOUNTER
Spoke with provider and he said not to take old dosage, wait till new rx arrives and start taking 2 mg glimepiride

## 2024-06-12 ENCOUNTER — TELEPHONE (OUTPATIENT)
Dept: ENDOCRINOLOGY | Facility: CLINIC | Age: 84
End: 2024-06-12

## 2024-06-12 NOTE — TELEPHONE ENCOUNTER
Caller: OCTAVIA WALSH    Relationship to patient: SELF    Best call back number: [phone]    Patient is needing: PATIENT SAW THE DOCTOR LAST WEEK AND HE TOLD HER TO GET VITAMIN D 5000MG OVER THE COUNTER. SHE CAN NOT FIND IT BUT CAN FIND VITAMIN D3. SHE IS WONDERING IF SHE CAN TAKE THAT AND CUT IT IN HALF. PLEASE GIVE PATIENT A CALL BACK.

## 2024-08-27 ENCOUNTER — LAB (OUTPATIENT)
Dept: LAB | Facility: HOSPITAL | Age: 84
End: 2024-08-27
Payer: MEDICARE

## 2024-08-27 DIAGNOSIS — E55.9 VITAMIN D DEFICIENCY: ICD-10-CM

## 2024-08-27 DIAGNOSIS — E11.65 TYPE 2 DIABETES MELLITUS WITH HYPERGLYCEMIA, WITHOUT LONG-TERM CURRENT USE OF INSULIN: ICD-10-CM

## 2024-08-27 LAB
25(OH)D3 SERPL-MCNC: 26.1 NG/ML (ref 30–100)
ALBUMIN SERPL-MCNC: 4.2 G/DL (ref 3.5–5.2)
ALBUMIN/GLOB SERPL: 1.6 G/DL
ALP SERPL-CCNC: 92 U/L (ref 39–117)
ALT SERPL W P-5'-P-CCNC: 13 U/L (ref 1–33)
ANION GAP SERPL CALCULATED.3IONS-SCNC: 10 MMOL/L (ref 5–15)
AST SERPL-CCNC: 16 U/L (ref 1–32)
BILIRUB SERPL-MCNC: 0.3 MG/DL (ref 0–1.2)
BUN SERPL-MCNC: 14 MG/DL (ref 8–23)
BUN/CREAT SERPL: 16.1 (ref 7–25)
CALCIUM SPEC-SCNC: 9.2 MG/DL (ref 8.6–10.5)
CHLORIDE SERPL-SCNC: 104 MMOL/L (ref 98–107)
CO2 SERPL-SCNC: 25 MMOL/L (ref 22–29)
CREAT SERPL-MCNC: 0.87 MG/DL (ref 0.57–1)
EGFRCR SERPLBLD CKD-EPI 2021: 67 ML/MIN/1.73
GLOBULIN UR ELPH-MCNC: 2.6 GM/DL
GLUCOSE SERPL-MCNC: 219 MG/DL (ref 65–99)
HBA1C MFR BLD: 7.48 % (ref 4.8–5.6)
POTASSIUM SERPL-SCNC: 4.6 MMOL/L (ref 3.5–5.2)
PROT SERPL-MCNC: 6.8 G/DL (ref 6–8.5)
SODIUM SERPL-SCNC: 139 MMOL/L (ref 136–145)

## 2024-08-27 PROCEDURE — 82306 VITAMIN D 25 HYDROXY: CPT

## 2024-08-27 PROCEDURE — 36415 COLL VENOUS BLD VENIPUNCTURE: CPT

## 2024-08-27 PROCEDURE — 83036 HEMOGLOBIN GLYCOSYLATED A1C: CPT

## 2024-08-27 PROCEDURE — 80053 COMPREHEN METABOLIC PANEL: CPT

## 2024-09-03 ENCOUNTER — OFFICE VISIT (OUTPATIENT)
Dept: ENDOCRINOLOGY | Facility: CLINIC | Age: 84
End: 2024-09-03
Payer: MEDICARE

## 2024-09-03 ENCOUNTER — LAB (OUTPATIENT)
Dept: LAB | Facility: HOSPITAL | Age: 84
End: 2024-09-03
Payer: MEDICARE

## 2024-09-03 VITALS
DIASTOLIC BLOOD PRESSURE: 60 MMHG | OXYGEN SATURATION: 99 % | BODY MASS INDEX: 22.08 KG/M2 | SYSTOLIC BLOOD PRESSURE: 138 MMHG | HEIGHT: 62 IN | HEART RATE: 72 BPM | WEIGHT: 120 LBS

## 2024-09-03 DIAGNOSIS — E55.9 VITAMIN D DEFICIENCY: ICD-10-CM

## 2024-09-03 DIAGNOSIS — E11.65 TYPE 2 DIABETES MELLITUS WITH HYPERGLYCEMIA, WITHOUT LONG-TERM CURRENT USE OF INSULIN: Primary | ICD-10-CM

## 2024-09-03 DIAGNOSIS — E11.649 DIABETIC HYPOGLYCEMIA: ICD-10-CM

## 2024-09-03 DIAGNOSIS — E11.65 TYPE 2 DIABETES MELLITUS WITH HYPERGLYCEMIA, WITHOUT LONG-TERM CURRENT USE OF INSULIN: ICD-10-CM

## 2024-09-03 DIAGNOSIS — M81.0 AGE-RELATED OSTEOPOROSIS WITHOUT CURRENT PATHOLOGICAL FRACTURE: ICD-10-CM

## 2024-09-03 DIAGNOSIS — E11.42 DIABETIC PERIPHERAL NEUROPATHY: ICD-10-CM

## 2024-09-03 LAB
ALBUMIN UR-MCNC: <1.2 MG/DL
CREAT UR-MCNC: 8.6 MG/DL
GLUCOSE BLDC GLUCOMTR-MCNC: 111 MG/DL (ref 70–105)
MICROALBUMIN/CREAT UR: NORMAL MG/G{CREAT}

## 2024-09-03 PROCEDURE — 3078F DIAST BP <80 MM HG: CPT | Performed by: INTERNAL MEDICINE

## 2024-09-03 PROCEDURE — 82948 REAGENT STRIP/BLOOD GLUCOSE: CPT | Performed by: INTERNAL MEDICINE

## 2024-09-03 PROCEDURE — 99214 OFFICE O/P EST MOD 30 MIN: CPT | Performed by: INTERNAL MEDICINE

## 2024-09-03 PROCEDURE — 82570 ASSAY OF URINE CREATININE: CPT

## 2024-09-03 PROCEDURE — 1159F MED LIST DOCD IN RCRD: CPT | Performed by: INTERNAL MEDICINE

## 2024-09-03 PROCEDURE — 1160F RVW MEDS BY RX/DR IN RCRD: CPT | Performed by: INTERNAL MEDICINE

## 2024-09-03 PROCEDURE — G2211 COMPLEX E/M VISIT ADD ON: HCPCS | Performed by: INTERNAL MEDICINE

## 2024-09-03 PROCEDURE — 3075F SYST BP GE 130 - 139MM HG: CPT | Performed by: INTERNAL MEDICINE

## 2024-09-03 PROCEDURE — 82043 UR ALBUMIN QUANTITATIVE: CPT

## 2024-09-03 RX ORDER — GLIMEPIRIDE 2 MG/1
2 TABLET ORAL 2 TIMES DAILY
Qty: 180 TABLET | Refills: 1 | Status: SHIPPED | OUTPATIENT
Start: 2024-09-03 | End: 2025-03-02

## 2024-09-03 RX ORDER — ALENDRONATE SODIUM 70 MG/1
70 TABLET ORAL
Qty: 12 TABLET | Refills: 1 | Status: SHIPPED | OUTPATIENT
Start: 2024-09-03

## 2024-09-03 NOTE — PATIENT INSTRUCTIONS
Please,    # Type 2 diabetes management  - Continue Januvia 100 mg 1 pill by mouth daily.  - Continue glimepiride 2 mg twice a day.  - Continue Jardiance 10 mg 1 pill by mouth daily.  - Check your blood sugar twice a day fasting and before dinnertime, please maintain the log of your blood sugars for me to review before next visit.    # Osteoporosis:  - Continue alendronate 70 mg 1 pill by mouth once a week  - Take it on empty stomach with at least 1-2 full glasses of water.  - Avoid taking it with any other flavored water, milk or any other drink including coffee.  - Swallow the whole tablet without chewing breaking or crushing.  - Do not lie down for at least 60 minutes after taking this drug or until you eat first food of the day.  - Continue calcium supplement either calcium carbonate or calcium citrate 600 mg once a day with meal  - Also take at least 2 different types of daily sources every day  - Continue vitamin D 5000 units every day with meal    Nonfasting blood work before next visit    Thank you for your visit today.    If you have any questions or concerns please feel free to reach out of the office.

## 2024-09-03 NOTE — PROGRESS NOTES
-----------------------------------------------------------------  ENDOCRINE CLINIC NOTE  -----------------------------------------------------------------        PATIENT NAME: Raul Howell  PATIENT : 1940 AGE: 83 y.o.  MRN NUMBER: [id number]  PRIMARY CARE: Shavonne Rush MD    ==========================================================================    CHIEF COMPLAINT: Type 2 diabetes with hyperglycemia and osteoporosis.  DATE OF SERVICE: 24    ==========================================================================    HPI / SUBJECTIVE    83 y.o. female is seen in the clinic today for type 2 diabetes with hyperglycemia and osteoporosis.  Diagnosis Date:   Current Therapy / Medications:  Januvia 100 mg p.o. daily  Glimepiride 2 mg twice a day  Jardiance 10 mg p.o. daily  Past tried medications: (Not currently using)  Metformin - cough  Trulicity - diarrhea  Home BG logs / monitoring: Checking BG three to four times a week,   Hypoglycemia: No hypoglycemia seen now  Meals / Dietary Habits: Two meals a day  Last eye exam: [facility] following   Neuropathy: Positive for neuropathy  Nephropathy: No CKD  Last lipid panel: Not on statin therapy  No hx of UTI  No CAD or CVA  No personal or family history of pancreatitis or thyroid cancer    Osteoporosis Hx:    Osteoporosis diagnosed in:   Previous and current treatments: Fosamax since 2021  Last DXA scan: 2024     History of fractures: (Traumatic vs Nontraumatic) None  (No falls or fractures since the last visit)  Physical activity: Independent for ADLs and IADLs  Exercise: Walks every day around 30 mins    Menopause: around 47  Any kids: 2 biological kids  Use of hormone replacement therapy: None    History of high calcium levels: None  History of kidney stones: None  Smoking history: None  Alcohol intake: None  History of chronic steroid use: None  History of rheumatoid arthritis: None  Hx of thyroid  problems: None    Hx of radiation therapy if considering anabolic: None    Family history of osteoporosis/fractures/parathyroid or calcium problems: None     Loss of height: possibly 1/2 inch  History of renal insufficiency: No CKD  Active dental/jaw problems: None  History of reflux (GERD) symptoms: None     Calcium intake: On supplement once day   * Milk: Every day   * Yogurt: None   * Cheese: None   * Ice-cream: 4 days a week    Vitamin D supplementation or sun exposure: 5000 units a day    ==========================================================================                                                PAST MEDICAL HISTORY    Past Medical History:   Diagnosis Date    Arthritis     Cataract     Hypertension        ==========================================================================    PAST SURGICAL HISTORY    Past Surgical History:   Procedure Laterality Date    BACK SURGERY  1984    BLADDER SURGERY      2x    HYSTERECTOMY  1994    KNEE SURGERY      right total knee    SKIN CANCER EXCISION Left 2015    melanoma on lt arm    WISDOM TOOTH EXTRACTION         ==========================================================================    FAMILY HISTORY    Family History   Problem Relation Age of Onset    Cancer Father     Lung cancer Father     Leukemia Father     Breast cancer Daughter        ==========================================================================    SOCIAL HISTORY    Social History     Socioeconomic History    Marital status:    Tobacco Use    Smoking status: Never     Passive exposure: Never    Smokeless tobacco: Never   Vaping Use    Vaping status: Never Used   Substance and Sexual Activity    Alcohol use: No    Drug use: No    Sexual activity: Yes       ==========================================================================    MEDICATIONS      Current Outpatient Medications:     acetaminophen (TYLENOL) 650 MG 8 hr tablet, Take 1 tablet by mouth Every 8 (Eight) Hours As  Needed for Mild Pain., Disp: , Rfl:     Alcohol Swabs (B-D SINGLE USE SWABS REGULAR) pads, 1 swab Daily., Disp: 100 each, Rfl: 1    alendronate (FOSAMAX) 70 MG tablet, Take 1 tablet by mouth Every 7 (Seven) Days., Disp: 12 tablet, Rfl: 1    amLODIPine (NORVASC) 5 MG tablet, Take 1 tablet by mouth Daily. for blood pressure, Disp: 90 tablet, Rfl: 2    Blood Glucose Calibration (TRUE METRIX LEVEL 2) Normal solution, 1 dose by In Vitro route Every 30 (Thirty) Days., Disp: 1 each, Rfl: 1    Blood Glucose Monitoring Suppl (TRUE METRIX AIR GLUCOSE METER) w/Device kit, 1 strip Daily., Disp: 1 kit, Rfl: 0    Cetirizine HCl (ZYRTEC PO), Take 1 tablet by mouth Daily., Disp: , Rfl:     empagliflozin (Jardiance) 10 MG tablet tablet, Take 1 tablet by mouth Daily., Disp: 90 tablet, Rfl: 1    glimepiride (Amaryl) 2 MG tablet, Take 1 tablet by mouth 2 (Two) Times a Day for 180 days., Disp: 180 tablet, Rfl: 1    glucose blood (True Metrix Blood Glucose Test) test strip, Test bs once a day (true metrix) dx: e11.65, Disp: 300 each, Rfl: 1    glucose blood test strip, 1 each by Other route 4 (Four) Times a Day Before Meals & at Bedtime., Disp: 200 each, Rfl: 5    glucose monitor monitoring kit, Use 1 each 4 (Four) Times a Day Before Meals & at Bedtime., Disp: 1 each, Rfl: 0    Lancets misc, Use 1 each 4 (Four) Times a Day Before Meals & at Bedtime., Disp: 200 each, Rfl: 5    Lifitegrast (Xiidra) 5 % ophthalmic solution, Apply  to eye(s) as directed by provider Every 12 (Twelve) Hours., Disp: , Rfl:     Restasis 0.05 % ophthalmic emulsion, Apply 1 drop to eye(s) as directed by provider Every 12 (Twelve) Hours., Disp: , Rfl:     SITagliptin (Januvia) 100 MG tablet, Take 1 tablet by mouth Daily., Disp: 90 tablet, Rfl: 3    TRUEPLUS LANCETS 30G misc, Test BS once a day, Disp: 100 each, Rfl: 1    ==========================================================================    ALLERGIES    Allergies   Allergen Reactions    Codeine GI Intolerance     Lisinopril Cough       ==========================================================================    OBJECTIVE    Vitals:    09/03/24 1259   BP: 138/60   Pulse: 72   SpO2: 99%     Body mass index is 21.95 kg/m².     General: Alert, cooperative, no acute distress  Thyroid:  no enlargement/tenderness/palpable nodules  Lungs: Clear to auscultation bilaterally, respirations unlabored  Heart: Regular rate and rhythm, S1 and S2 normal, no murmur, rub or gallop  Abdomen: Soft, NT, ND and Bowel sounds Positive    ==========================================================================    LAB EVALUATION    Lab Results   Component Value Date    GLUCOSE 219 (H) 08/27/2024    BUN 14 08/27/2024    CREATININE 0.87 08/27/2024    EGFRIFNONA 65 01/20/2022    BCR 16.1 08/27/2024    K 4.6 08/27/2024    CO2 25.0 08/27/2024    CALCIUM 9.2 08/27/2024    ALBUMIN 4.2 08/27/2024    LABIL2 1.3 03/22/2019    AST 16 08/27/2024    ALT 13 08/27/2024    CHOL 195 01/20/2022    TRIG 146 01/20/2022    HDL 46 01/20/2022     (H) 01/20/2022     Lab Results   Component Value Date    HGBA1C 7.48 (H) 08/27/2024    HGBA1C 7.80 (H) 04/03/2024    HGBA1C 7.80 (H) 09/19/2023     Lab Results   Component Value Date    MICROALBUR 2.8 04/04/2024    CREATININE 0.87 08/27/2024     Lab Results   Component Value Date    TSH 0.630 08/16/2022     ==========================================================================    DEXA scan:    4/11/2024 - [facility]  Left forarm 1/2:  T Score -2.4  Lumbar Spine T score -1.0  Left femoral neck T score -3.0  Left Total hip T score -2.7    7/26/2021 - [facility]  Lumbar Spine T score -1.7  Left femoral neck -2.8  Left total hip T score -3.0      ==========================================================================    ASSESSMENT AND PLAN    # Type 2 diabetes with hyperglycemia  # Diabetic peripheral neuropathy  # Diabetic hypoglycemia    - Reviewed fingerstick data and blood sugar within acceptable  limit  - Patient target was to achieve A1c less than 8% and it would be better if she is able to maintain it less than 7.5% which is currently at goal  - Patient had this target because of previous history of diabetic hypoglycemia and given history of osteoporosis she will be had the risk for fall and osteoporotic fracture therefore currently at goal  - Patient is tolerating SGLT2 inhibitor therapy without any side effects  - Will continue therapy as:  Januvia 100 mg p.o. daily  Glimepiride decreased to 2 mg twice a day  Jardiance 10 mg p.o. daily  - Patient to check fingersticks twice a day fasting and before dinnertime and maintain logs and bring for me to review during next visit    # Osteoporosis, age-related without pathological fracture  -Reviewed DEXA scan from 2021 in 2024  - Patient currently on Fosamax therapy  - There is evidence of vitamin D deficiency patient is currently taking 5000 units every day while also taking calcium supplementation with dietary calcium intake as well  - Repeat DEXA scan in 2026 and if needed in the future we will plan for possibly Prolia therapy    Return to clinic: 6 months    Entire assessment and plan was discussed and counseled the patient in detail to which patient verbalized understanding and agreed with care.  Answered all queries and concerns.    Part of this note may be an electronic transcription/translation of spoken language to printed text using the Dragon Dictation System.     Note: Portions of this note may have been copied from previous notes but documentation have been reviewed and edited as necessary to support clinical decision making for today's visit.    ==========================================================================    INFORMATION PROVIDED TO PATIENT    Patient Instructions   Please,    # Type 2 diabetes management  - Continue Januvia 100 mg 1 pill by mouth daily.  - Continue glimepiride 2 mg twice a day.  - Continue Jardiance 10 mg 1 pill by  mouth daily.  - Check your blood sugar twice a day fasting and before dinnertime, please maintain the log of your blood sugars for me to review before next visit.    # Osteoporosis:  - Continue alendronate 70 mg 1 pill by mouth once a week  - Take it on empty stomach with at least 1-2 full glasses of water.  - Avoid taking it with any other flavored water, milk or any other drink including coffee.  - Swallow the whole tablet without chewing breaking or crushing.  - Do not lie down for at least 60 minutes after taking this drug or until you eat first food of the day.  - Continue calcium supplement either calcium carbonate or calcium citrate 600 mg once a day with meal  - Also take at least 2 different types of daily sources every day  - Continue vitamin D 5000 units every day with meal    Nonfasting blood work before next visit    Thank you for your visit today.    If you have any questions or concerns please feel free to reach out of the office.       ==========================================================================  Richard Diane MD  Department of Endocrine, Diabetes and Metabolism  [facility], [place]  ==========================================================================

## 2024-10-08 ENCOUNTER — OFFICE VISIT (OUTPATIENT)
Dept: FAMILY MEDICINE CLINIC | Facility: CLINIC | Age: 84
End: 2024-10-08
Payer: MEDICARE

## 2024-10-08 VITALS
SYSTOLIC BLOOD PRESSURE: 124 MMHG | WEIGHT: 121 LBS | DIASTOLIC BLOOD PRESSURE: 80 MMHG | TEMPERATURE: 97.6 F | OXYGEN SATURATION: 94 % | HEART RATE: 84 BPM | BODY MASS INDEX: 22.26 KG/M2 | HEIGHT: 62 IN

## 2024-10-08 DIAGNOSIS — Z23 NEED FOR INFLUENZA VACCINATION: ICD-10-CM

## 2024-10-08 DIAGNOSIS — R82.81 PYURIA: ICD-10-CM

## 2024-10-08 DIAGNOSIS — I10 ESSENTIAL HYPERTENSION: ICD-10-CM

## 2024-10-08 DIAGNOSIS — Z00.00 ENCOUNTER FOR ANNUAL WELLNESS EXAM IN MEDICARE PATIENT: Primary | ICD-10-CM

## 2024-10-08 LAB
BILIRUB BLD-MCNC: NEGATIVE MG/DL
CLARITY, POC: CLEAR
COLOR UR: YELLOW
EXPIRATION DATE: ABNORMAL
GLUCOSE UR STRIP-MCNC: ABNORMAL MG/DL
KETONES UR QL: NEGATIVE
LEUKOCYTE EST, POC: ABNORMAL
Lab: ABNORMAL
NITRITE UR-MCNC: NEGATIVE MG/ML
PH UR: 6 [PH] (ref 5–8)
PROT UR STRIP-MCNC: NEGATIVE MG/DL
RBC # UR STRIP: ABNORMAL /UL
SP GR UR: 1.02 (ref 1–1.03)
UROBILINOGEN UR QL: NORMAL

## 2024-10-08 PROCEDURE — 87086 URINE CULTURE/COLONY COUNT: CPT | Performed by: FAMILY MEDICINE

## 2024-10-08 PROCEDURE — 3074F SYST BP LT 130 MM HG: CPT | Performed by: FAMILY MEDICINE

## 2024-10-08 PROCEDURE — 90662 IIV NO PRSV INCREASED AG IM: CPT | Performed by: FAMILY MEDICINE

## 2024-10-08 PROCEDURE — G0439 PPPS, SUBSEQ VISIT: HCPCS | Performed by: FAMILY MEDICINE

## 2024-10-08 PROCEDURE — G0008 ADMIN INFLUENZA VIRUS VAC: HCPCS | Performed by: FAMILY MEDICINE

## 2024-10-08 PROCEDURE — 1170F FXNL STATUS ASSESSED: CPT | Performed by: FAMILY MEDICINE

## 2024-10-08 PROCEDURE — 99213 OFFICE O/P EST LOW 20 MIN: CPT | Performed by: FAMILY MEDICINE

## 2024-10-08 PROCEDURE — 1160F RVW MEDS BY RX/DR IN RCRD: CPT | Performed by: FAMILY MEDICINE

## 2024-10-08 PROCEDURE — 3079F DIAST BP 80-89 MM HG: CPT | Performed by: FAMILY MEDICINE

## 2024-10-08 PROCEDURE — 1126F AMNT PAIN NOTED NONE PRSNT: CPT | Performed by: FAMILY MEDICINE

## 2024-10-08 PROCEDURE — 81003 URINALYSIS AUTO W/O SCOPE: CPT | Performed by: FAMILY MEDICINE

## 2024-10-08 PROCEDURE — 1159F MED LIST DOCD IN RCRD: CPT | Performed by: FAMILY MEDICINE

## 2024-10-08 RX ORDER — PHENOL 1.4 %
600 AEROSOL, SPRAY (ML) MUCOUS MEMBRANE DAILY
COMMUNITY

## 2024-10-08 RX ORDER — SULFAMETHOXAZOLE/TRIMETHOPRIM 800-160 MG
1 TABLET ORAL 2 TIMES DAILY
Qty: 10 TABLET | Refills: 0 | Status: SHIPPED | OUTPATIENT
Start: 2024-10-08 | End: 2024-10-13

## 2024-10-08 NOTE — PROGRESS NOTES
Subjective   The ABCs of the Annual Wellness Visit  Medicare Wellness Visit      Raul Howell is a 83 y.o. patient who presents for a Medicare Wellness Visit.    The following portions of the patient's history were reviewed and   updated as appropriate: allergies, current medications, past family history, past medical history, past social history, past surgical history, and problem list.    Compared to one year ago, the patient's physical   health is the same.  Compared to one year ago, the patient's mental   health is the same.    Recent Hospitalizations:  She was not admitted to the hospital during the last year.     Current Medical Providers:  Patient Care Team:  Shavonne Rush MD as PCP - General  Shavonne Rush MD as PCP - Family Medicine  Richard Diane MD as Consulting Physician (Endocrinology)  Bang Goss MD as Consulting Physician (Orthopedic Surgery)  Graeme Frances MD as Consulting Physician (Urology)    Outpatient Medications Prior to Visit   Medication Sig Dispense Refill    acetaminophen (TYLENOL) 650 MG 8 hr tablet Take 1 tablet by mouth Every 8 (Eight) Hours As Needed for Mild Pain.      Alcohol Swabs (B-D SINGLE USE SWABS REGULAR) pads 1 swab Daily. 100 each 1    alendronate (FOSAMAX) 70 MG tablet Take 1 tablet by mouth Every 7 (Seven) Days. 12 tablet 1    amLODIPine (NORVASC) 5 MG tablet Take 1 tablet by mouth Daily. for blood pressure 90 tablet 2    Blood Glucose Calibration (TRUE METRIX LEVEL 2) Normal solution 1 dose by In Vitro route Every 30 (Thirty) Days. 1 each 1    Blood Glucose Monitoring Suppl (TRUE METRIX AIR GLUCOSE METER) w/Device kit 1 strip Daily. 1 kit 0    calcium carbonate (OS-GARRETT) 600 MG tablet Take 1 tablet by mouth Daily.      empagliflozin (Jardiance) 10 MG tablet tablet Take 1 tablet by mouth Daily. 90 tablet 1    glimepiride (Amaryl) 2 MG tablet Take 1 tablet by mouth 2 (Two) Times a Day for 180 days. 180 tablet 1    glucose blood (True  Metrix Blood Glucose Test) test strip Test bs once a day (true metrix) dx: e11.65 300 each 1    glucose blood test strip 1 each by Other route 4 (Four) Times a Day Before Meals & at Bedtime. 200 each 5    glucose monitor monitoring kit Use 1 each 4 (Four) Times a Day Before Meals & at Bedtime. 1 each 0    Lancets misc Use 1 each 4 (Four) Times a Day Before Meals & at Bedtime. 200 each 5    Lifitegrast (Xiidra) 5 % ophthalmic solution Apply  to eye(s) as directed by provider Every 12 (Twelve) Hours.      Restasis 0.05 % ophthalmic emulsion Apply 1 drop to eye(s) as directed by provider Every 12 (Twelve) Hours.      SITagliptin (Januvia) 100 MG tablet Take 1 tablet by mouth Daily. 90 tablet 1    TRUEPLUS LANCETS 30G misc Test BS once a day 100 each 1    vitamin D3 125 MCG (5000 UT) capsule capsule Take 1 capsule by mouth Daily.      Cetirizine HCl (ZYRTEC PO) Take 1 tablet by mouth Daily. (Patient not taking: Reported on 10/8/2024)       No facility-administered medications prior to visit.     No opioid medication identified on active medication list. I have reviewed chart for other potential  high risk medication/s and harmful drug interactions in the elderly.      Aspirin is not on active medication list.  Aspirin use is not indicated based on review of current medical condition/s. Risk of harm outweighs potential benefits.  .    Patient Active Problem List   Diagnosis    Arthritis of elbow    Female bladder prolapse    History of malignant melanoma of skin    Essential hypertension    Microalbuminuria    Osteoarthritis    Other screening mammogram    Encounter for annual wellness exam in Medicare patient    Type 2 diabetes mellitus, without long-term current use of insulin    Bilateral posterior capsular opacification    Bilateral pseudophakia    Diabetes mellitus treated with oral medication    Dry eyes    Nevus of choroid of right eye     Advance Care Planning Advance Directive is not on file.  ACP discussion was  "held with the patient during this visit. Patient has an advance directive (not in EMR), copy requested.            Objective   Vitals:    10/08/24 1316   BP: 124/80   BP Location: Left arm   Patient Position: Sitting   Cuff Size: Adult   Pulse: 84   Temp: 97.6 °F (36.4 °C)   TempSrc: Temporal   SpO2: 94%   Weight: 54.9 kg (121 lb)   Height: 157.5 cm (62\")   PainSc: 0-No pain       Estimated body mass index is 22.13 kg/m² as calculated from the following:    Height as of this encounter: 157.5 cm (62\").    Weight as of this encounter: 54.9 kg (121 lb).    BMI is within normal parameters. No other follow-up for BMI required.       Does the patient have evidence of cognitive impairment? No  MMSE 30/30  \  Lab Results   Component Value Date    HGBA1C 7.48 (H) 08/27/2024                                                                                                Health  Risk Assessment    Smoking Status:  Social History     Tobacco Use   Smoking Status Never    Passive exposure: Never   Smokeless Tobacco Never     Alcohol Consumption:  Social History     Substance and Sexual Activity   Alcohol Use No       Fall Risk Screen  STEADI Fall Risk Assessment was completed, and patient is at LOW risk for falls.Assessment completed on:10/8/2024    Depression Screening:      10/8/2024     1:27 PM   PHQ-2/PHQ-9 Depression Screening   Little Interest or Pleasure in Doing Things 0-->not at all   Feeling Down, Depressed or Hopeless 0-->not at all   PHQ-9: Brief Depression Severity Measure Score 0     Health Habits and Functional and Cognitive Screening:      10/8/2024     1:30 PM   Functional & Cognitive Status   Do you have difficulty preparing food and eating? No   Do you have difficulty bathing yourself, getting dressed or grooming yourself? No   Do you have difficulty using the toilet? No   Do you have difficulty moving around from place to place? No   Do you have trouble with steps or getting out of a bed or a chair? No "   Current Diet Limited Junk Food   Dental Exam Up to date   Eye Exam Not up to date   Exercise (times per week) 0 times per week   Current Exercises Include No Regular Exercise;Walking   Do you need help using the phone?  No   Are you deaf or do you have serious difficulty hearing?  No   Do you need help to go to places out of walking distance? No   Do you need help shopping? No   Do you need help preparing meals?  No   Do you need help with housework?  No   Do you need help with laundry? No   Do you need help taking your medications? No   Do you need help managing money? No   Do you ever drive or ride in a car without wearing a seat belt? No   Have you felt unusual stress, anger or loneliness in the last month? No   Who do you live with? Spouse   If you need help, do you have trouble finding someone available to you? No   Have you been bothered in the last four weeks by sexual problems? No   Do you have difficulty concentrating, remembering or making decisions? No           Age-appropriate Screening Schedule:  Refer to the list below for future screening recommendations based on patient's age, sex and/or medical conditions. Orders for these recommended tests are listed in the plan section. The patient has been provided with a written plan.    Health Maintenance List  Health Maintenance   Topic Date Due    ZOSTER VACCINE (1 of 2) Never done    COVID-19 Vaccine (7 - 2023-24 season) 09/01/2024    DIABETIC EYE EXAM  12/04/2024    HEMOGLOBIN A1C  02/27/2025    URINE MICROALBUMIN  09/03/2025    ANNUAL WELLNESS VISIT  10/08/2025    DXA SCAN  04/11/2026    TDAP/TD VACCINES (2 - Tdap) 10/28/2029    RSV Vaccine - Adults  Completed    INFLUENZA VACCINE  Completed    Pneumococcal Vaccine 65+  Completed                                                                                                                                                CMS Preventative Services Quick Reference  Risk Factors Identified During  "Encounter  Immunizations Discussed/Encouraged: Influenza    The above risks/problems have been discussed with the patient.  Pertinent information has been shared with the patient in the After Visit Summary.  An After Visit Summary and PPPS were made available to the patient.    Follow Up:   Next Medicare Wellness visit to be scheduled in 1 year.         Additional E&M Note during same encounter follows:  Patient has additional, significant, and separately identifiable condition(s)/problem(s) that require work above and beyond the Medicare Wellness Visit     Chief Complaint  Medicare Wellness-subsequent and Bladder Prolapse (Also occ has burning sensation for a few days on/off. )    Subjective   HPI  Raul is also being seen today for additional medical problem: for follow up on bp  Endo follows dm  Having some dysuria  Denies fever, N/V    Review of Systems   Constitutional: Negative.    Respiratory: Negative.     Cardiovascular: Negative.    Genitourinary:  Positive for dysuria and frequency. Negative for hematuria.              Objective   Vital Signs:  /80 (BP Location: Left arm, Patient Position: Sitting, Cuff Size: Adult)   Pulse 84   Temp 97.6 °F (36.4 °C) (Temporal)   Ht 157.5 cm (62\")   Wt 54.9 kg (121 lb)   SpO2 94%   BMI 22.13 kg/m²   Physical Exam  Vitals and nursing note reviewed.   Constitutional:       Appearance: Normal appearance. She is normal weight.   Cardiovascular:      Rate and Rhythm: Normal rate and regular rhythm.      Heart sounds: Normal heart sounds.   Pulmonary:      Effort: Pulmonary effort is normal.      Breath sounds: Normal breath sounds.   Abdominal:      General: Abdomen is flat. Bowel sounds are normal.      Palpations: Abdomen is soft.      Tenderness: There is no abdominal tenderness. There is no right CVA tenderness or left CVA tenderness.   Musculoskeletal:      Right lower leg: No edema.      Left lower leg: No edema.   Neurological:      Mental Status: She is " alert and oriented to person, place, and time.           Lab Results   Component Value Date    GLUCOSE 219 (H) 08/27/2024    BUN 14 08/27/2024    CREATININE 0.87 08/27/2024     08/27/2024    K 4.6 08/27/2024     08/27/2024    CALCIUM 9.2 08/27/2024    PROTEINTOT 6.8 08/27/2024    ALBUMIN 4.2 08/27/2024    ALT 13 08/27/2024    AST 16 08/27/2024    ALKPHOS 92 08/27/2024    BILITOT 0.3 08/27/2024    GLOB 2.6 08/27/2024    AGRATIO 1.6 08/27/2024    BCR 16.1 08/27/2024    ANIONGAP 10.0 08/27/2024    EGFR 67.0 08/27/2024     Brief Urine Lab Results  (Last result in the past 365 days)        Color   Clarity   Blood   Leuk Est   Nitrite   Protein   CREAT   Urine HCG        10/08/24 1341 Yellow   Clear   Trace   Small (1+)   Negative   Negative                           Assessment and Plan               Encounter for annual wellness exam in Medicare patient  She has advanced directives  She wants a flu shot  Essential hypertension  Hypertension is stable and controlled  Continue current treatment regimen.  Blood pressure will be reassessed in 1 year.  Pyuria  Urine sent for culture  Rx bactrim DS  Need for influenza vaccination      Orders Placed This Encounter   Procedures    Urine Culture - Urine, Urine, Clean Catch     Order Specific Question:   Release to patient     Answer:   Routine Release [[id number]]    Fluzone High-Dose 65+yrs    POCT urinalysis dipstick, automated     Order Specific Question:   Release to patient     Answer:   Routine Release [[id number]]     New Medications Ordered This Visit   Medications    sulfamethoxazole-trimethoprim (Bactrim DS) 800-160 MG per tablet     Sig: Take 1 tablet by mouth 2 (Two) Times a Day for 5 days.     Dispense:  10 tablet     Refill:  0          Follow Up   Return in about 1 year (around 10/8/2025) for Medicare Wellness.  Patient was given instructions and counseling regarding her condition or for health maintenance advice. Please see specific information pulled  into the AVS if appropriate.

## 2024-10-09 LAB — BACTERIA SPEC AEROBE CULT: NORMAL

## 2024-10-24 RX ORDER — AMLODIPINE BESYLATE 5 MG/1
5 TABLET ORAL DAILY
Qty: 90 TABLET | Refills: 3 | Status: SHIPPED | OUTPATIENT
Start: 2024-10-24

## 2025-02-28 DIAGNOSIS — M81.0 AGE-RELATED OSTEOPOROSIS WITHOUT CURRENT PATHOLOGICAL FRACTURE: ICD-10-CM

## 2025-03-03 RX ORDER — ALENDRONATE SODIUM 70 MG/1
TABLET ORAL
Qty: 12 TABLET | Refills: 3 | Status: SHIPPED | OUTPATIENT
Start: 2025-03-03

## 2025-03-11 ENCOUNTER — LAB (OUTPATIENT)
Dept: ENDOCRINOLOGY | Facility: CLINIC | Age: 85
End: 2025-03-11
Payer: MEDICARE

## 2025-03-11 DIAGNOSIS — E11.65 TYPE 2 DIABETES MELLITUS WITH HYPERGLYCEMIA, WITHOUT LONG-TERM CURRENT USE OF INSULIN: ICD-10-CM

## 2025-03-11 DIAGNOSIS — E55.9 VITAMIN D DEFICIENCY: ICD-10-CM

## 2025-03-12 LAB
25(OH)D3+25(OH)D2 SERPL-MCNC: 19.2 NG/ML (ref 30–100)
ALBUMIN SERPL-MCNC: 4.5 G/DL (ref 3.7–4.7)
ALBUMIN/CREAT UR: 74 MG/G CREAT (ref 0–29)
ALP SERPL-CCNC: 97 IU/L (ref 44–121)
ALT SERPL-CCNC: 15 IU/L (ref 0–32)
AST SERPL-CCNC: 13 IU/L (ref 0–40)
BILIRUB SERPL-MCNC: 0.3 MG/DL (ref 0–1.2)
BUN SERPL-MCNC: 25 MG/DL (ref 8–27)
BUN/CREAT SERPL: 26 (ref 12–28)
CALCIUM SERPL-MCNC: 9.6 MG/DL (ref 8.7–10.3)
CHLORIDE SERPL-SCNC: 102 MMOL/L (ref 96–106)
CO2 SERPL-SCNC: 23 MMOL/L (ref 20–29)
CREAT SERPL-MCNC: 0.98 MG/DL (ref 0.57–1)
CREAT UR-MCNC: 45.5 MG/DL
EGFRCR SERPLBLD CKD-EPI 2021: 58 ML/MIN/1.73
GLOBULIN SER CALC-MCNC: 2.5 G/DL (ref 1.5–4.5)
GLUCOSE SERPL-MCNC: 172 MG/DL (ref 70–99)
HBA1C MFR BLD: 8 % (ref 4.8–5.6)
MICROALBUMIN UR-MCNC: 33.6 UG/ML
POTASSIUM SERPL-SCNC: 4.4 MMOL/L (ref 3.5–5.2)
PROT SERPL-MCNC: 7 G/DL (ref 6–8.5)
SODIUM SERPL-SCNC: 140 MMOL/L (ref 134–144)

## 2025-03-18 ENCOUNTER — OFFICE VISIT (OUTPATIENT)
Dept: ENDOCRINOLOGY | Facility: CLINIC | Age: 85
End: 2025-03-18
Payer: MEDICARE

## 2025-03-18 VITALS
HEART RATE: 75 BPM | OXYGEN SATURATION: 98 % | SYSTOLIC BLOOD PRESSURE: 140 MMHG | WEIGHT: 120 LBS | HEIGHT: 62 IN | DIASTOLIC BLOOD PRESSURE: 62 MMHG | BODY MASS INDEX: 22.08 KG/M2

## 2025-03-18 DIAGNOSIS — E11.649 DIABETIC HYPOGLYCEMIA: ICD-10-CM

## 2025-03-18 DIAGNOSIS — E11.42 DIABETIC PERIPHERAL NEUROPATHY: ICD-10-CM

## 2025-03-18 DIAGNOSIS — E55.9 VITAMIN D DEFICIENCY: ICD-10-CM

## 2025-03-18 DIAGNOSIS — E11.65 TYPE 2 DIABETES MELLITUS WITH HYPERGLYCEMIA, WITHOUT LONG-TERM CURRENT USE OF INSULIN: Primary | ICD-10-CM

## 2025-03-18 DIAGNOSIS — M81.0 AGE-RELATED OSTEOPOROSIS WITHOUT CURRENT PATHOLOGICAL FRACTURE: ICD-10-CM

## 2025-03-18 RX ORDER — GLIMEPIRIDE 2 MG/1
2 TABLET ORAL 2 TIMES DAILY
Qty: 180 TABLET | Refills: 1 | Status: SHIPPED | OUTPATIENT
Start: 2025-03-18 | End: 2025-09-14

## 2025-03-18 RX ORDER — ERGOCALCIFEROL 1.25 MG/1
50000 CAPSULE, LIQUID FILLED ORAL WEEKLY
Qty: 12 CAPSULE | Refills: 0 | Status: SHIPPED | OUTPATIENT
Start: 2025-03-18

## 2025-03-18 NOTE — PATIENT INSTRUCTIONS
Please,    # Type 2 diabetes management  - Continue Januvia 100 mg 1 pill by mouth daily.  - Continue glimepiride 2 mg twice a day.  - Continue Jardiance 10 mg 1 pill by mouth daily.  - Check your blood sugar twice a day fasting and before dinnertime, please maintain the log of your blood sugars for me to review before next visit.  -Please consume 3 scheduled meals and avoid snacking.    # Osteoporosis:  - Continue alendronate 70 mg 1 pill by mouth once a week  - Take it on empty stomach with at least 1-2 full glasses of water.  - Avoid taking it with any other flavored water, milk or any other drink including coffee.  - Swallow the whole tablet without chewing breaking or crushing.  - Do not lie down for at least 60 minutes after taking this drug or until you eat first food of the day.  - Continue calcium supplement either calcium carbonate or calcium citrate 600 mg once a day with meal  - Also take at least 2 different types of daily sources every day  - Start taking vitamin D 50,000 units once a week for 12 weeks and after completing 12 weeks of therapy start taking again 5000 units of vitamin D over-the-counter, vitamin D and steals to be taken with meal    Nonfasting blood work before next visit    Thank you for your visit today.    If you have any questions or concerns please feel free to reach out of the office.

## 2025-03-18 NOTE — PROGRESS NOTES
-----------------------------------------------------------------  ENDOCRINE CLINIC NOTE  -----------------------------------------------------------------        PATIENT NAME: Raul Howell  PATIENT : 1940 AGE: 84 y.o.  MRN NUMBER: [id number]  PRIMARY CARE: Shavonne Ruhs MD    ==========================================================================    CHIEF COMPLAINT: Type 2 diabetes with hyperglycemia and osteoporosis.  DATE OF SERVICE: 25    ==========================================================================    HPI / SUBJECTIVE    84 y.o. female is seen in the clinic today for type 2 diabetes with hyperglycemia and osteoporosis.    Diabetes Mellitus:    Diagnosis Date:   Current Therapy / Medications:  Januvia 100 mg p.o. daily  Glimepiride 2 mg twice a day  Jardiance 10 mg p.o. daily  Past tried medications: (Not currently using)  Metformin - cough  Trulicity - diarrhea  Home BG logs / monitoring: Checking BG with fingersticks  Hypoglycemia: Hypoglycemia seen when patient had Covid 19  Meals / Dietary Habits: Two meals a day  Last eye exam: Following [facility]  Neuropathy: Positive for neuropathy  Nephropathy: No CKD with microalbuminuria in 2025  Last lipid panel: Not on statin therapy  No hx of UTI  No CAD or CVA  No personal or family history of pancreatitis or thyroid cancer    Osteoporosis Hx:    Osteoporosis diagnosed in:   Previous and current treatments:   Fosamax since 2021  Last DXA scan: 2024     History of fractures: (Traumatic vs Nontraumatic) None  (No falls or fractures since the last visit)  Physical activity: Independent for ADLs and IADLs  Exercise: Walks every day around 30 mins    Menopause: around 47  Any kids: 2 biological kids  Use of hormone replacement therapy: None    History of high calcium levels: None  History of kidney stones: None  Smoking history: None  Alcohol intake: None  History of chronic steroid  use: None  History of rheumatoid arthritis: None  Hx of thyroid problems: None    Hx of radiation therapy if considering anabolic: None    Family history of osteoporosis/fractures/parathyroid or calcium problems: None     Loss of height: possibly 1/2 inch  History of renal insufficiency: No CKD  Active dental/jaw problems: None  History of reflux (GERD) symptoms: None  No reported fall or fractures     Calcium intake: On supplement once day   * Milk: Every day   * Yogurt: None   * Cheese: None   * Ice-cream: 4 days a week    Vitamin D supplementation or sun exposure: 5000 units a day - not taking regularly     ==========================================================================                                                PAST MEDICAL HISTORY    Past Medical History:   Diagnosis Date    Arthritis     Cataract     Hypertension        ==========================================================================    PAST SURGICAL HISTORY    Past Surgical History:   Procedure Laterality Date    BACK SURGERY  1984    BLADDER SURGERY      2x    HYSTERECTOMY  1994    KNEE SURGERY      right total knee    SKIN CANCER EXCISION Left 2015    melanoma on lt arm    WISDOM TOOTH EXTRACTION         ==========================================================================    FAMILY HISTORY    Family History   Problem Relation Age of Onset    Cancer Father     Lung cancer Father     Leukemia Father     Breast cancer Daughter        ==========================================================================    SOCIAL HISTORY    Social History     Socioeconomic History    Marital status:    Tobacco Use    Smoking status: Never     Passive exposure: Never    Smokeless tobacco: Never   Vaping Use    Vaping status: Never Used   Substance and Sexual Activity    Alcohol use: No    Drug use: No    Sexual activity: Yes       ==========================================================================    MEDICATIONS      Current  Outpatient Medications:     acetaminophen (TYLENOL) 650 MG 8 hr tablet, Take 1 tablet by mouth Every 8 (Eight) Hours As Needed for Mild Pain., Disp: , Rfl:     Alcohol Swabs (B-D SINGLE USE SWABS REGULAR) pads, 1 swab Daily., Disp: 100 each, Rfl: 1    alendronate (FOSAMAX) 70 MG tablet, TAKE 1 TABLET BY MOUTH WEEKLY  WITH 8 OZ OF PLAIN WATER 30  MINUTES BEFORE FIRST FOOD, DRINK OR MEDS. STAY UPRIGHT FOR 30  MINS, Disp: 12 tablet, Rfl: 3    amLODIPine (NORVASC) 5 MG tablet, TAKE 1 TABLET BY MOUTH DAILY FOR BLOOD PRESSURE, Disp: 90 tablet, Rfl: 3    Blood Glucose Calibration (TRUE METRIX LEVEL 2) Normal solution, 1 dose by In Vitro route Every 30 (Thirty) Days., Disp: 1 each, Rfl: 1    Blood Glucose Monitoring Suppl (TRUE METRIX AIR GLUCOSE METER) w/Device kit, 1 strip Daily., Disp: 1 kit, Rfl: 0    calcium carbonate (OS-GARRETT) 600 MG tablet, Take 1 tablet by mouth Daily., Disp: , Rfl:     empagliflozin (Jardiance) 10 MG tablet tablet, Take 1 tablet by mouth Daily., Disp: 90 tablet, Rfl: 1    glimepiride (Amaryl) 2 MG tablet, Take 1 tablet by mouth 2 (Two) Times a Day for 180 days., Disp: 180 tablet, Rfl: 1    glucose blood (True Metrix Blood Glucose Test) test strip, Test bs once a day (true metrix) dx: e11.65, Disp: 300 each, Rfl: 1    glucose blood test strip, 1 each by Other route 4 (Four) Times a Day Before Meals & at Bedtime., Disp: 200 each, Rfl: 5    glucose monitor monitoring kit, Use 1 each 4 (Four) Times a Day Before Meals & at Bedtime., Disp: 1 each, Rfl: 0    Lancets misc, Use 1 each 4 (Four) Times a Day Before Meals & at Bedtime., Disp: 200 each, Rfl: 5    Restasis 0.05 % ophthalmic emulsion, Apply 1 drop to eye(s) as directed by provider Every 12 (Twelve) Hours., Disp: , Rfl:     SITagliptin (Januvia) 100 MG tablet, Take 1 tablet by mouth Daily., Disp: 90 tablet, Rfl: 1    TRUEPLUS LANCETS 30G misc, Test BS once a day, Disp: 100 each, Rfl: 1    vitamin D3 125 MCG (5000 UT) capsule capsule, Take 1 capsule  by mouth Daily., Disp: , Rfl:     Lifitegrast (Xiidra) 5 % ophthalmic solution, Apply  to eye(s) as directed by provider Every 12 (Twelve) Hours. (Patient not taking: Reported on 3/18/2025), Disp: , Rfl:     vitamin D (ERGOCALCIFEROL) 1.25 MG (07407 UT) capsule capsule, Take 1 capsule by mouth 1 (One) Time Per Week., Disp: 12 capsule, Rfl: 0    ==========================================================================    ALLERGIES    Allergies   Allergen Reactions    Codeine GI Intolerance    Lisinopril Cough       ==========================================================================    OBJECTIVE    Vitals:    03/18/25 1126   BP: 140/62   Pulse: 75   SpO2: 98%     Body mass index is 21.95 kg/m².     General: Alert, cooperative, no acute distress  Thyroid:  no enlargement/tenderness/palpable nodules  Lungs: Clear to auscultation bilaterally, respirations unlabored  Heart: Regular rate and rhythm, S1 and S2 normal, no murmur, rub or gallop  Abdomen: Soft, NT, ND and Bowel sounds Positive    ==========================================================================    LAB EVALUATION    Lab Results   Component Value Date    GLUCOSE 172 (H) 03/11/2025    BUN 25 03/11/2025    CREATININE 0.98 03/11/2025    EGFRIFNONA 65 01/20/2022    BCR 26 03/11/2025    K 4.4 03/11/2025    CO2 23 03/11/2025    CALCIUM 9.6 03/11/2025    ALBUMIN 4.5 03/11/2025    AST 13 03/11/2025    ALT 15 03/11/2025    CHOL 195 01/20/2022    TRIG 146 01/20/2022    HDL 46 01/20/2022     (H) 01/20/2022     Lab Results   Component Value Date    HGBA1C 8.0 (H) 03/11/2025    HGBA1C 7.48 (H) 08/27/2024    HGBA1C 7.80 (H) 04/03/2024     Lab Results   Component Value Date    MICROALBUR 33.6 03/11/2025    CREATININE 0.98 03/11/2025     Lab Results   Component Value Date    TSH 0.630 08/16/2022     ==========================================================================    DEXA scan:    4/11/2024 - [facility]  Left forarm 1/2:  T Score  -2.4  Lumbar Spine T score -1.0  Left femoral neck T score -3.0  Left Total hip T score -2.7    7/26/2021 - [facility]  Lumbar Spine T score -1.7  Left femoral neck -2.8  Left total hip T score -3.0    ==========================================================================    ASSESSMENT AND PLAN    # Type 2 diabetes with hyperglycemia  # Diabetic peripheral neuropathy  # Diabetic hypoglycemia    -Reviewed blood sugar data  - Patient have significant history of hypoglycemia in the past  - Patient is still eating mostly snacks rather than meals, counseled patient in detail about having proper meals and avoiding snacks  - A1c slightly elevated today to 8% in target is to keep A1c less than 8% ideally less than 7.5% but without any hypoglycemia  - Discussed with patient about lifestyle modification while continue current medication  - Discussed with patient about using a small dose of insulin Lantus in the morning but patient deferred that for now  - We will continue same therapy and reevaluate patient in 3 months with repeat A1c  Januvia 100 mg p.o. daily  Glimepiride 2 mg twice a day  Jardiance 10 mg p.o. daily  - Patient to check fingersticks with fingersticks    # Osteoporosis, age-related without pathological fracture  - Reviewed DEXA scan from 2021 until 2024  - Continue Fosamax therapy  - Plan for repeat DEXA scan in April 2026, will review the results and further care accordingly  - Patient may require Prolia therapy in the future    # Vit D deficiency, patient started on 50,000 units once a week for 12 weeks and then transition to 5 4 units every day with meal    Return to clinic: 3 months    Entire assessment and plan was discussed and counseled the patient in detail to which patient verbalized understanding and agreed with care.  Answered all queries and concerns.    Part of this note may be an electronic transcription/translation of spoken language to printed text using the Dragon Dictation System.      Note: Portions of this note may have been copied from previous notes but documentation have been reviewed and edited as necessary to support clinical decision making for today's visit.    ==========================================================================    INFORMATION PROVIDED TO PATIENT    Patient Instructions   Please,    # Type 2 diabetes management  - Continue Januvia 100 mg 1 pill by mouth daily.  - Continue glimepiride 2 mg twice a day.  - Continue Jardiance 10 mg 1 pill by mouth daily.  - Check your blood sugar twice a day fasting and before dinnertime, please maintain the log of your blood sugars for me to review before next visit.  -Please consume 3 scheduled meals and avoid snacking.    # Osteoporosis:  - Continue alendronate 70 mg 1 pill by mouth once a week  - Take it on empty stomach with at least 1-2 full glasses of water.  - Avoid taking it with any other flavored water, milk or any other drink including coffee.  - Swallow the whole tablet without chewing breaking or crushing.  - Do not lie down for at least 60 minutes after taking this drug or until you eat first food of the day.  - Continue calcium supplement either calcium carbonate or calcium citrate 600 mg once a day with meal  - Also take at least 2 different types of daily sources every day  - Start taking vitamin D 50,000 units once a week for 12 weeks and after completing 12 weeks of therapy start taking again 5000 units of vitamin D over-the-counter, vitamin D and steals to be taken with meal    Nonfasting blood work before next visit    Thank you for your visit today.    If you have any questions or concerns please feel free to reach out of the office.       ==========================================================================  Richard Diane MD  Department of Endocrine, Diabetes and Metabolism  [place], [place]  ==========================================================================

## 2025-03-24 DIAGNOSIS — E11.65 TYPE 2 DIABETES MELLITUS WITH HYPERGLYCEMIA, WITHOUT LONG-TERM CURRENT USE OF INSULIN: Primary | ICD-10-CM

## 2025-03-24 RX ORDER — CALCIUM CITRATE/VITAMIN D3 200MG-6.25
TABLET ORAL
Qty: 300 EACH | Refills: 3 | Status: SHIPPED | OUTPATIENT
Start: 2025-03-24

## 2025-04-03 DIAGNOSIS — E11.65 TYPE 2 DIABETES MELLITUS WITH HYPERGLYCEMIA, WITHOUT LONG-TERM CURRENT USE OF INSULIN: ICD-10-CM

## 2025-04-04 RX ORDER — CALCIUM CITRATE/VITAMIN D3 200MG-6.25
TABLET ORAL
Qty: 300 EACH | Refills: 3 | Status: SHIPPED | OUTPATIENT
Start: 2025-04-04

## 2025-05-05 ENCOUNTER — TELEPHONE (OUTPATIENT)
Dept: ENDOCRINOLOGY | Facility: CLINIC | Age: 85
End: 2025-05-05
Payer: MEDICARE

## 2025-05-05 NOTE — TELEPHONE ENCOUNTER
Hub staff attempted to follow warm transfer process and was unsuccessful     Caller: Raul Howell    Relationship to patient: Self    Best call back number:[phone]     Patient is needing: PT WANTS WHOLE NEW DEVICE SENT TO [facility] AN ACCUCHECK. HER DEVICE STOPPED WORKING. PLEASE ADVISE.

## 2025-05-07 DIAGNOSIS — E11.65 TYPE 2 DIABETES MELLITUS WITH HYPERGLYCEMIA, WITHOUT LONG-TERM CURRENT USE OF INSULIN: Primary | ICD-10-CM

## 2025-05-07 RX ORDER — DIPHENHYDRAMINE HCL 25 MG
1 TABLET ORAL DAILY
Qty: 1 KIT | Refills: 0 | Status: SHIPPED | OUTPATIENT
Start: 2025-05-07

## 2025-05-22 DIAGNOSIS — E11.65 TYPE 2 DIABETES MELLITUS WITH HYPERGLYCEMIA, WITHOUT LONG-TERM CURRENT USE OF INSULIN: ICD-10-CM

## 2025-05-22 RX ORDER — SITAGLIPTIN 100 MG/1
100 TABLET, FILM COATED ORAL DAILY
Qty: 90 TABLET | Refills: 0 | Status: SHIPPED | OUTPATIENT
Start: 2025-05-22

## 2025-05-27 DIAGNOSIS — E11.65 TYPE 2 DIABETES MELLITUS WITH HYPERGLYCEMIA, WITHOUT LONG-TERM CURRENT USE OF INSULIN: ICD-10-CM

## 2025-05-27 RX ORDER — EMPAGLIFLOZIN 10 MG/1
10 TABLET, FILM COATED ORAL DAILY
Qty: 90 TABLET | Refills: 1 | Status: SHIPPED | OUTPATIENT
Start: 2025-05-27

## 2025-06-03 DIAGNOSIS — E55.9 VITAMIN D DEFICIENCY: ICD-10-CM

## 2025-06-03 RX ORDER — ERGOCALCIFEROL 1.25 MG/1
50000 CAPSULE, LIQUID FILLED ORAL WEEKLY
Qty: 12 CAPSULE | Refills: 3 | Status: SHIPPED | OUTPATIENT
Start: 2025-06-03

## 2025-06-20 RX ORDER — CEPHALEXIN 500 MG/1
CAPSULE ORAL
COMMUNITY
Start: 2025-05-13

## 2025-06-23 ENCOUNTER — OFFICE VISIT (OUTPATIENT)
Dept: ENDOCRINOLOGY | Facility: CLINIC | Age: 85
End: 2025-06-23
Payer: MEDICARE

## 2025-06-23 VITALS
DIASTOLIC BLOOD PRESSURE: 75 MMHG | HEART RATE: 69 BPM | OXYGEN SATURATION: 98 % | HEIGHT: 62 IN | SYSTOLIC BLOOD PRESSURE: 125 MMHG | WEIGHT: 109 LBS | BODY MASS INDEX: 20.06 KG/M2

## 2025-06-23 DIAGNOSIS — E55.9 VITAMIN D DEFICIENCY: ICD-10-CM

## 2025-06-23 DIAGNOSIS — E11.65 TYPE 2 DIABETES MELLITUS WITH HYPERGLYCEMIA, WITHOUT LONG-TERM CURRENT USE OF INSULIN: Primary | ICD-10-CM

## 2025-06-23 DIAGNOSIS — E11.649 DIABETIC HYPOGLYCEMIA: ICD-10-CM

## 2025-06-23 DIAGNOSIS — M81.0 AGE-RELATED OSTEOPOROSIS WITHOUT CURRENT PATHOLOGICAL FRACTURE: ICD-10-CM

## 2025-06-23 DIAGNOSIS — E11.42 DIABETIC PERIPHERAL NEUROPATHY: ICD-10-CM

## 2025-06-23 PROCEDURE — 1159F MED LIST DOCD IN RCRD: CPT | Performed by: INTERNAL MEDICINE

## 2025-06-23 PROCEDURE — 99214 OFFICE O/P EST MOD 30 MIN: CPT | Performed by: INTERNAL MEDICINE

## 2025-06-23 PROCEDURE — G2211 COMPLEX E/M VISIT ADD ON: HCPCS | Performed by: INTERNAL MEDICINE

## 2025-06-23 PROCEDURE — 1160F RVW MEDS BY RX/DR IN RCRD: CPT | Performed by: INTERNAL MEDICINE

## 2025-06-23 PROCEDURE — 3078F DIAST BP <80 MM HG: CPT | Performed by: INTERNAL MEDICINE

## 2025-06-23 PROCEDURE — 3074F SYST BP LT 130 MM HG: CPT | Performed by: INTERNAL MEDICINE

## 2025-06-23 RX ORDER — ALENDRONATE SODIUM 70 MG/1
70 TABLET ORAL
Qty: 13 TABLET | Refills: 3 | Status: SHIPPED | OUTPATIENT
Start: 2025-06-23

## 2025-06-23 RX ORDER — CALCIUM CITRATE/VITAMIN D3 200MG-6.25
TABLET ORAL
Qty: 100 EACH | Refills: 3 | Status: SHIPPED | OUTPATIENT
Start: 2025-06-23

## 2025-06-23 RX ORDER — GLIMEPIRIDE 2 MG/1
2 TABLET ORAL 2 TIMES DAILY
Qty: 180 TABLET | Refills: 3 | Status: SHIPPED | OUTPATIENT
Start: 2025-06-23 | End: 2026-06-18

## 2025-06-23 RX ORDER — ERGOCALCIFEROL (VITAMIN D2) 50 MCG
2000 CAPSULE ORAL DAILY
Qty: 100 CAPSULE | Refills: 4 | Status: SHIPPED | OUTPATIENT
Start: 2025-06-23

## 2025-06-23 NOTE — PATIENT INSTRUCTIONS
Please,    # Type 2 diabetes management  - Continue Januvia 100 mg 1 pill by mouth daily.  - Continue glimepiride 2 mg twice a day.  - Continue Jardiance 10 mg 1 pill by mouth daily.  - Check your blood sugar daily in fasting, please maintain the log of your blood sugars for me to review before next visit.  - Please consume 3 scheduled meals and avoid snacking.    # Osteoporosis:  - Continue alendronate 70 mg 1 pill by mouth once a week  - Take it on empty stomach with at least 1-2 full glasses of water.  - Avoid taking it with any other flavored water, milk or any other drink including coffee.  - Swallow the whole tablet without chewing breaking or crushing.  - Do not lie down for at least 60 minutes after taking this drug or until you eat first food of the day.  - Continue calcium supplement either calcium carbonate or calcium citrate 600 mg once a day with meal  - Also take at least 2 different types of daily sources every day  - After completing 50,000 units, start 2000 units daily    Nonfasting blood work before next visit.    Thank you for your visit today.    If you have any questions or concerns please feel free to reach out of the office.

## 2025-06-23 NOTE — ADDENDUM NOTE
Addended by: MIGUEL TURNER on: 6/23/2025 01:58 PM     Modules accepted: Orders     depression with SI

## 2025-06-23 NOTE — PROGRESS NOTES
-----------------------------------------------------------------  ENDOCRINE CLINIC NOTE  -----------------------------------------------------------------        PATIENT NAME: Raul Howell  PATIENT : 1940 AGE: 84 y.o.  MRN NUMBER: [id number]  PRIMARY CARE: Shavonne Rush MD    ==========================================================================    CHIEF COMPLAINT: Type 2 diabetes with hyperglycemia and osteoporosis.  DATE OF SERVICE: 25    ==========================================================================    HPI / SUBJECTIVE    84 y.o. female is seen in the clinic today for type 2 diabetes with hyperglycemia and osteoporosis.    Diabetes Mellitus:    Diagnosis Date:   Current Therapy / Medications:  Januvia 100 mg p.o. daily  Glimepiride 2 mg twice a day  Jardiance 10 mg p.o. daily  Past tried medications: (Not currently using)  Metformin - cough  Trulicity - diarrhea  Home BG logs / monitoring: Checking BG with fingersticks  Meals / Dietary Habits: Two meals a day  Last eye exam: Following [facility], [place], operated by [facility] - Dec 2024  Neuropathy: Positive for neuropathy  Nephropathy: No CKD with microalbuminuria in 2025  Last lipid panel: Not on statin therapy  No hx of UTI  No CAD or CVA  No personal or family history of pancreatitis or thyroid cancer    Osteoporosis Hx:    Osteoporosis diagnosed in:   Previous and current treatments:   Fosamax since 2021  Last DXA scan: 2024  Physical activity: Independent for ADLs and IADLs  Exercise: Walks every day around 30 mins  Menopause: around 47  Any kids: 2 biological kids  History of renal insufficiency: No CKD  Active dental/jaw problems: None  History of reflux (GERD) symptoms: None  No reported fall or fractures since last visit  Calcium intake: On supplement once day   * Milk: Every day   * Yogurt: None   * Cheese: None   * Ice-cream: 4 days a week  Vitamin D supplementation or sun exposure: 87608 units  weekly and is left with 3 of those then will start 5000 units daily    ==========================================================================                                                PAST MEDICAL HISTORY    Past Medical History:   Diagnosis Date    Arthritis     Cataract     Hypertension        ==========================================================================    PAST SURGICAL HISTORY    Past Surgical History:   Procedure Laterality Date    BACK SURGERY  1984    BLADDER SURGERY      2x    HYSTERECTOMY  1994    KNEE SURGERY      right total knee    SKIN CANCER EXCISION Left 2015    melanoma on lt arm    WISDOM TOOTH EXTRACTION         ==========================================================================    FAMILY HISTORY    Family History   Problem Relation Age of Onset    Cancer Father     Lung cancer Father     Leukemia Father     Breast cancer Daughter        ==========================================================================    SOCIAL HISTORY    Social History     Socioeconomic History    Marital status:    Tobacco Use    Smoking status: Never     Passive exposure: Never    Smokeless tobacco: Never   Vaping Use    Vaping status: Never Used   Substance and Sexual Activity    Alcohol use: No    Drug use: No    Sexual activity: Yes       ==========================================================================    MEDICATIONS      Current Outpatient Medications:     acetaminophen (TYLENOL) 650 MG 8 hr tablet, Take 1 tablet by mouth Every 8 (Eight) Hours As Needed for Mild Pain., Disp: , Rfl:     Alcohol Swabs (B-D SINGLE USE SWABS REGULAR) pads, 1 swab Daily., Disp: 100 each, Rfl: 1    alendronate (FOSAMAX) 70 MG tablet, Take 1 tablet by mouth Every 7 (Seven) Days., Disp: 13 tablet, Rfl: 3    amLODIPine (NORVASC) 5 MG tablet, TAKE 1 TABLET BY MOUTH DAILY FOR BLOOD PRESSURE, Disp: 90 tablet, Rfl: 3    Blood Glucose Calibration (TRUE METRIX LEVEL 2) Normal solution, 1 dose by In  Vitro route Every 30 (Thirty) Days., Disp: 1 each, Rfl: 1    Blood Glucose Monitoring Suppl (True Metrix Air Glucose Meter) w/Device kit, Use 1 strip Daily., Disp: 1 kit, Rfl: 0    calcium carbonate (OS-GARRETT) 600 MG tablet, Take 1 tablet by mouth Daily., Disp: , Rfl:     cephalexin (KEFLEX) 500 MG capsule, , Disp: , Rfl:     empagliflozin (Jardiance) 10 MG tablet tablet, Take 1 tablet by mouth Daily., Disp: 90 tablet, Rfl: 3    glimepiride (Amaryl) 2 MG tablet, Take 1 tablet by mouth 2 (Two) Times a Day for 360 days., Disp: 180 tablet, Rfl: 3    glucose blood (True Metrix Blood Glucose Test) test strip, Test bs once a day (true metrix) dx: e11.65, Disp: 300 each, Rfl: 3    glucose blood test strip, 1 each by Other route 4 (Four) Times a Day Before Meals & at Bedtime., Disp: 200 each, Rfl: 5    glucose monitor monitoring kit, Use 1 each 4 (Four) Times a Day Before Meals & at Bedtime., Disp: 1 each, Rfl: 0    Lancets misc, Use 1 each 4 (Four) Times a Day Before Meals & at Bedtime., Disp: 200 each, Rfl: 5    Restasis 0.05 % ophthalmic emulsion, Apply 1 drop to eye(s) as directed by provider Every 12 (Twelve) Hours., Disp: , Rfl:     SITagliptin (Januvia) 100 MG tablet, Take 1 tablet by mouth Daily., Disp: 90 tablet, Rfl: 3    TRUEPLUS LANCETS 30G misc, Test BS once a day, Disp: 100 each, Rfl: 1    Vitamin D, Ergocalciferol, 50 MCG (2000 UT) capsule, Take 2,000 Units by mouth Daily., Disp: 100 capsule, Rfl: 4    ==========================================================================    ALLERGIES    Allergies   Allergen Reactions    Codeine GI Intolerance    Lisinopril Cough       ==========================================================================    OBJECTIVE    Vitals:    06/23/25 1316   BP: 125/75   Pulse: 69   SpO2: 98%     Body mass index is 19.94 kg/m².     General: Alert, cooperative, no acute distress  Thyroid:  no enlargement/tenderness/palpable nodules  Lungs: Clear to auscultation bilaterally,  respirations unlabored  Heart: Regular rate and rhythm, S1 and S2 normal, no murmur, rub or gallop  Abdomen: Soft, NT, ND and Bowel sounds Positive    ==========================================================================    LAB EVALUATION    Lab Results   Component Value Date    GLUCOSE 83 06/17/2025    BUN 13 06/17/2025    CREATININE 0.86 06/17/2025    EGFRIFNONA 65 01/20/2022    BCR 15 06/17/2025    K 4.2 06/17/2025    CO2 22 06/17/2025    CALCIUM 9.4 06/17/2025    ALBUMIN 4.1 06/17/2025    AST 16 06/17/2025    ALT 16 06/17/2025    CHOL 195 01/20/2022    TRIG 146 01/20/2022    HDL 46 01/20/2022     (H) 01/20/2022     Lab Results   Component Value Date    HGBA1C 6.4 (H) 06/17/2025    HGBA1C 8.0 (H) 03/11/2025    HGBA1C 7.48 (H) 08/27/2024     Lab Results   Component Value Date    MICROALBUR 33.6 03/11/2025    CREATININE 0.86 06/17/2025     Lab Results   Component Value Date    TSH 0.630 08/16/2022     ==========================================================================    DEXA scan:    4/11/2024 - [facility]  Left forarm 1/2:  T Score -2.4  Lumbar Spine T score -1.0  Left femoral neck T score -3.0  Left Total hip T score -2.7    7/26/2021 - [facility]  Lumbar Spine T score -1.7  Left femoral neck -2.8  Left total hip T score -3.0    ==========================================================================    ASSESSMENT AND PLAN    # Type 2 diabetes with hyperglycemia  # Diabetic peripheral neuropathy  # Diabetic hypoglycemia    - Reviewed blood sugar data pattern  - Patient maintaining good life style and following healthy diet pattern  - We will continue same therapy and reevaluate patient in 4 months with repeat A1c  Januvia 100 mg p.o. daily  Glimepiride 2 mg twice a day  Jardiance 10 mg p.o. daily  - Patient to check fingersticks with finger sticks once a day    # Osteoporosis, age-related without pathological fracture  - Reviewed DEXA scan from 2021 until 2024  - Continue Fosamax  therapy  - Plan for repeat DEXA scan in April 2026, will review the results and further care accordingly  - Patient may require Prolia therapy in the future    # Vit D deficiency, 2000 units daily after completing 50,000 units weekly    Return to clinic: 3 months    Entire assessment and plan was discussed and counseled the patient in detail to which patient verbalized understanding and agreed with care.  Answered all queries and concerns.    Part of this note may be an electronic transcription/translation of spoken language to printed text using the Dragon Dictation System.     Note: Portions of this note may have been copied from previous notes but documentation have been reviewed and edited as necessary to support clinical decision making for today's visit.    ==========================================================================    INFORMATION PROVIDED TO PATIENT    Patient Instructions   Please,    # Type 2 diabetes management  - Continue Januvia 100 mg 1 pill by mouth daily.  - Continue glimepiride 2 mg twice a day.  - Continue Jardiance 10 mg 1 pill by mouth daily.  - Check your blood sugar daily in fasting, please maintain the log of your blood sugars for me to review before next visit.  - Please consume 3 scheduled meals and avoid snacking.    # Osteoporosis:  - Continue alendronate 70 mg 1 pill by mouth once a week  - Take it on empty stomach with at least 1-2 full glasses of water.  - Avoid taking it with any other flavored water, milk or any other drink including coffee.  - Swallow the whole tablet without chewing breaking or crushing.  - Do not lie down for at least 60 minutes after taking this drug or until you eat first food of the day.  - Continue calcium supplement either calcium carbonate or calcium citrate 600 mg once a day with meal  - Also take at least 2 different types of daily sources every day  - After completing 50,000 units, start 2000 units daily    Nonfasting blood work before next  visit.    Thank you for your visit today.    If you have any questions or concerns please feel free to reach out of the office.       ==========================================================================  Richard Diane MD  Department of Endocrine, Diabetes and Metabolism  [place], [place]  ==========================================================================

## 2025-07-08 DIAGNOSIS — E11.65 TYPE 2 DIABETES MELLITUS WITH HYPERGLYCEMIA, WITHOUT LONG-TERM CURRENT USE OF INSULIN: ICD-10-CM

## 2025-07-08 RX ORDER — CALCIUM CITRATE/VITAMIN D3 200MG-6.25
TABLET ORAL
Qty: 100 EACH | Refills: 3 | Status: SHIPPED | OUTPATIENT
Start: 2025-07-08

## 2025-08-27 ENCOUNTER — TELEPHONE (OUTPATIENT)
Dept: ENDOCRINOLOGY | Facility: CLINIC | Age: 85
End: 2025-08-27
Payer: MEDICARE

## 2025-08-27 DIAGNOSIS — E11.65 TYPE 2 DIABETES MELLITUS WITH HYPERGLYCEMIA, WITHOUT LONG-TERM CURRENT USE OF INSULIN: ICD-10-CM

## 2025-08-29 RX ORDER — CALCIUM CITRATE/VITAMIN D3 200MG-6.25
TABLET ORAL
Qty: 100 EACH | Refills: 3 | Status: SHIPPED | OUTPATIENT
Start: 2025-08-29